# Patient Record
Sex: MALE | Race: WHITE | NOT HISPANIC OR LATINO | Employment: OTHER | URBAN - METROPOLITAN AREA
[De-identification: names, ages, dates, MRNs, and addresses within clinical notes are randomized per-mention and may not be internally consistent; named-entity substitution may affect disease eponyms.]

---

## 2017-02-27 ENCOUNTER — HOSPITAL ENCOUNTER (OUTPATIENT)
Dept: PAIN MEDICINE | Facility: HOSPITAL | Age: 62
Discharge: HOME | End: 2017-02-27
Attending: PAIN MEDICINE | Admitting: PAIN MEDICINE

## 2017-03-02 ENCOUNTER — HOSPITAL ENCOUNTER (OUTPATIENT)
Dept: OTHER | Facility: HOSPITAL | Age: 62
Discharge: HOME | End: 2017-03-02
Attending: FAMILY MEDICINE | Admitting: FAMILY MEDICINE

## 2017-03-02 LAB
ALBUMIN SERPL-MCNC: 4.3 G/DL (ref 3.5–4.8)
ALBUMIN/GLOB SERPL: 1.6 {RATIO} (ref 1–1.7)
ALP SERPL-CCNC: 41 IU/L (ref 32–91)
ALT SERPL-CCNC: 24 IU/L (ref 17–63)
ANION GAP SERPL CALC-SCNC: 17.2 MMOL/L (ref 10–20)
AST SERPL-CCNC: 23 IU/L (ref 15–41)
BASOPHILS # BLD AUTO: 0.1 10*3/UL (ref 0–0.2)
BASOPHILS NFR BLD AUTO: 1 % (ref 0–2)
BILIRUB SERPL-MCNC: 0.9 MG/DL (ref 0.3–1.2)
BILIRUB UR QL STRIP: NEGATIVE MG/DL
BUN SERPL-MCNC: 14 MG/DL (ref 8–20)
BUN/CREAT SERPL: 11.7 (ref 6.2–20.3)
CALCIUM SERPL-MCNC: 9.8 MG/DL (ref 8.9–10.3)
CASTS URNS QL MICRO: NORMAL /[LPF]
CHLORIDE SERPL-SCNC: 101 MMOL/L (ref 101–111)
CHOLEST SERPL-MCNC: 196 MG/DL
CHOLEST/HDLC SERPL: 4.9 {RATIO}
COLOR UR: YELLOW
CONV BACTERIA IN URINE MICRO: NEGATIVE
CONV CLARITY OF URINE: CLEAR
CONV CO2: 26 MMOL/L (ref 22–32)
CONV HYALINE CASTS IN URINE MICRO: 3 /[LPF] (ref 0–5)
CONV LDL CHOLESTEROL DIRECT: 102 MG/DL (ref 0–100)
CONV MICROALBUM.,U,RANDOM: <2 MG/L
CONV PROTEIN IN URINE BY AUTOMATED TEST STRIP: NEGATIVE MG/DL
CONV SMALL ROUND CELLS: NORMAL /[HPF]
CONV TOTAL PROTEIN: 7 G/DL (ref 6.1–7.9)
CONV UROBILINOGEN IN URINE BY AUTOMATED TEST STRIP: 0.2 MG/DL
CREAT 24H UR-MCNC: 83.1 MG/DL
CREAT UR-MCNC: 1.2 MG/DL (ref 0.7–1.2)
CULTURE INDICATED?: NORMAL
DIFFERENTIAL METHOD BLD: (no result)
EOSINOPHIL # BLD AUTO: 0.3 10*3/UL (ref 0–0.3)
EOSINOPHIL # BLD AUTO: 5 % (ref 0–3)
ERYTHROCYTE [DISTWIDTH] IN BLOOD BY AUTOMATED COUNT: 14.8 % (ref 11.5–14.5)
GLOBULIN UR ELPH-MCNC: 2.7 G/DL (ref 2.5–3.8)
GLUCOSE SERPL-MCNC: 109 MG/DL (ref 65–99)
GLUCOSE UR QL: NEGATIVE MG/DL
HCT VFR BLD AUTO: 38.1 % (ref 40–54)
HDLC SERPL-MCNC: 40 MG/DL
HGB BLD-MCNC: 13.6 G/DL (ref 14–18)
HGB UR QL STRIP: NEGATIVE
KETONES UR QL STRIP: NEGATIVE MG/DL
LDLC/HDLC SERPL: 2.5 {RATIO}
LEUKOCYTE ESTERASE UR QL STRIP: NEGATIVE
LIPID INTERPRETATION: ABNORMAL
LYMPHOCYTES # BLD AUTO: 2.7 10*3/UL (ref 0.8–4.8)
LYMPHOCYTES NFR BLD AUTO: 40 % (ref 18–42)
MCH RBC QN AUTO: 32.6 PG (ref 26–32)
MCHC RBC AUTO-ENTMCNC: 35.8 G/DL (ref 32–36)
MCV RBC AUTO: 91.1 FL (ref 80–94)
MICROALBUMIN/CREAT UR: NORMAL UG/MG
MONOCYTES # BLD AUTO: 0.6 10*3/UL (ref 0.1–1.3)
MONOCYTES NFR BLD AUTO: 10 % (ref 2–11)
NEUTROPHILS # BLD AUTO: 3 10*3/UL (ref 2.3–8.6)
NEUTROPHILS NFR BLD AUTO: 44 % (ref 50–75)
NITRITE UR QL STRIP: NEGATIVE
NRBC BLD AUTO-RTO: 0 /100{WBCS}
NRBC/RBC NFR BLD MANUAL: 0 10*3/UL
PH UR STRIP.AUTO: 5 [PH] (ref 4.5–8)
PLATELET # BLD AUTO: 244 10*3/UL (ref 150–450)
PMV BLD AUTO: 8.1 FL (ref 7.4–10.4)
POTASSIUM SERPL-SCNC: 4.2 MMOL/L (ref 3.6–5.1)
RBC # BLD AUTO: 4.18 10*6/UL (ref 4.6–6)
RBC #/AREA URNS HPF: 0 /[HPF] (ref 0–3)
SODIUM SERPL-SCNC: 140 MMOL/L (ref 136–144)
SP GR UR: 1.01 (ref 1–1.03)
SPERM URNS QL MICRO: NORMAL /[HPF]
SQUAMOUS SPT QL MICRO: 1 /[HPF] (ref 0–5)
TRIGL SERPL-MCNC: 406 MG/DL
TSH SERPL-ACNC: 4.01 UIU/ML (ref 0.34–5.6)
UNIDENT CRYS URNS QL MICRO: NORMAL /[HPF]
VLDLC SERPL CALC-MCNC: 53.5 MG/DL
WBC # BLD AUTO: 6.7 10*3/UL (ref 4.5–11.5)
WBC #/AREA URNS HPF: 1 /[HPF] (ref 0–5)
YEAST SPEC QL WET PREP: NORMAL /[HPF]

## 2017-03-13 ENCOUNTER — HOSPITAL ENCOUNTER (OUTPATIENT)
Dept: PAIN MEDICINE | Facility: HOSPITAL | Age: 62
Discharge: HOME | End: 2017-03-13
Attending: PAIN MEDICINE | Admitting: PAIN MEDICINE

## 2017-03-27 ENCOUNTER — HOSPITAL ENCOUNTER (OUTPATIENT)
Dept: PAIN MEDICINE | Facility: HOSPITAL | Age: 62
Discharge: HOME | End: 2017-03-27
Attending: PAIN MEDICINE | Admitting: PAIN MEDICINE

## 2017-06-15 ENCOUNTER — HOSPITAL ENCOUNTER (OUTPATIENT)
Dept: LAB | Facility: HOSPITAL | Age: 62
Discharge: HOME | End: 2017-06-15
Attending: INTERNAL MEDICINE | Admitting: INTERNAL MEDICINE

## 2017-06-15 LAB
ANION GAP SERPL CALC-SCNC: 14.3 MMOL/L (ref 10–20)
BUN SERPL-MCNC: 12 MG/DL (ref 8–20)
BUN/CREAT SERPL: 12 (ref 6.2–20.3)
CALCIUM SERPL-MCNC: 9.7 MG/DL (ref 8.9–10.3)
CHLORIDE SERPL-SCNC: 97 MMOL/L (ref 101–111)
CONV CO2: 29 MMOL/L (ref 22–32)
CREAT UR-MCNC: 1 MG/DL (ref 0.7–1.2)
GLUCOSE SERPL-MCNC: 133 MG/DL (ref 65–99)
POTASSIUM SERPL-SCNC: ABNORMAL MMOL/L (ref 3.6–5.1)
SODIUM SERPL-SCNC: 136 MMOL/L (ref 136–144)

## 2017-06-29 ENCOUNTER — HOSPITAL ENCOUNTER (OUTPATIENT)
Dept: LAB | Facility: HOSPITAL | Age: 62
Discharge: HOME | End: 2017-06-29
Attending: INTERNAL MEDICINE | Admitting: INTERNAL MEDICINE

## 2017-06-29 LAB
ANION GAP SERPL CALC-SCNC: 14.2 MMOL/L (ref 10–20)
BUN SERPL-MCNC: 6 MG/DL (ref 8–20)
BUN/CREAT SERPL: 6 (ref 6.2–20.3)
CALCIUM SERPL-MCNC: 10.1 MG/DL (ref 8.9–10.3)
CHLORIDE SERPL-SCNC: 103 MMOL/L (ref 101–111)
CONV CO2: 26 MMOL/L (ref 22–32)
CREAT UR-MCNC: 1 MG/DL (ref 0.7–1.2)
GLUCOSE SERPL-MCNC: 120 MG/DL (ref 65–99)
POTASSIUM SERPL-SCNC: 4.2 MMOL/L (ref 3.6–5.1)
SODIUM SERPL-SCNC: 139 MMOL/L (ref 136–144)

## 2017-12-29 ENCOUNTER — HOSPITAL ENCOUNTER (OUTPATIENT)
Dept: PHARMACY | Facility: HOSPITAL | Age: 62
Discharge: HOME | End: 2017-12-29
Attending: FAMILY MEDICINE | Admitting: FAMILY MEDICINE

## 2017-12-29 LAB
ALBUMIN SERPL-MCNC: 4.5 G/DL (ref 3.5–4.8)
ALBUMIN/GLOB SERPL: 1.6 {RATIO} (ref 1–1.7)
ALP SERPL-CCNC: 35 IU/L (ref 32–91)
ALT SERPL-CCNC: 22 IU/L (ref 17–63)
ANION GAP SERPL CALC-SCNC: 14.2 MMOL/L (ref 10–20)
AST SERPL-CCNC: 27 IU/L (ref 15–41)
BASOPHILS # BLD AUTO: 0 10*3/UL (ref 0–0.2)
BASOPHILS NFR BLD AUTO: 0 % (ref 0–2)
BILIRUB SERPL-MCNC: 0.9 MG/DL (ref 0.3–1.2)
BUN SERPL-MCNC: 7 MG/DL (ref 8–20)
BUN/CREAT SERPL: 7.8 (ref 6.2–20.3)
CALCIUM SERPL-MCNC: 10 MG/DL (ref 8.9–10.3)
CHLORIDE SERPL-SCNC: 100 MMOL/L (ref 101–111)
CONV CO2: 26 MMOL/L (ref 22–32)
CONV TOTAL PROTEIN: 7.3 G/DL (ref 6.1–7.9)
CREAT UR-MCNC: 0.9 MG/DL (ref 0.7–1.2)
DIFFERENTIAL METHOD BLD: (no result)
EOSINOPHIL # BLD AUTO: 0.1 10*3/UL (ref 0–0.3)
EOSINOPHIL # BLD AUTO: 2 % (ref 0–3)
ERYTHROCYTE [DISTWIDTH] IN BLOOD BY AUTOMATED COUNT: 13.8 % (ref 11.5–14.5)
GLOBULIN UR ELPH-MCNC: 2.8 G/DL (ref 2.5–3.8)
GLUCOSE SERPL-MCNC: 106 MG/DL (ref 65–99)
HCT VFR BLD AUTO: 44.4 % (ref 40–54)
HGB BLD-MCNC: 15 G/DL (ref 14–18)
LYMPHOCYTES # BLD AUTO: 2.2 10*3/UL (ref 0.8–4.8)
LYMPHOCYTES NFR BLD AUTO: 29 % (ref 18–42)
MCH RBC QN AUTO: 32.9 PG (ref 26–32)
MCHC RBC AUTO-ENTMCNC: 33.7 G/DL (ref 32–36)
MCV RBC AUTO: 97.4 FL (ref 80–94)
MONOCYTES # BLD AUTO: 1 10*3/UL (ref 0.1–1.3)
MONOCYTES NFR BLD AUTO: 13 % (ref 2–11)
NEUTROPHILS # BLD AUTO: 4.2 10*3/UL (ref 2.3–8.6)
NEUTROPHILS NFR BLD AUTO: 56 % (ref 50–75)
NRBC BLD AUTO-RTO: 0 /100{WBCS}
NRBC/RBC NFR BLD MANUAL: 0 10*3/UL
PLATELET # BLD AUTO: 248 10*3/UL (ref 150–450)
PMV BLD AUTO: 7.7 FL (ref 7.4–10.4)
POTASSIUM SERPL-SCNC: 4.2 MMOL/L (ref 3.6–5.1)
RBC # BLD AUTO: 4.56 10*6/UL (ref 4.6–6)
SODIUM SERPL-SCNC: 136 MMOL/L (ref 136–144)
WBC # BLD AUTO: 7.6 10*3/UL (ref 4.5–11.5)

## 2018-01-01 ENCOUNTER — ON CAMPUS - OUTPATIENT (OUTPATIENT)
Dept: URBAN - METROPOLITAN AREA HOSPITAL 85 | Facility: HOSPITAL | Age: 63
End: 2018-01-01
Payer: COMMERCIAL

## 2018-01-01 DIAGNOSIS — Z96.89 PRESENCE OF OTHER SPECIFIED FUNCTIONAL IMPLANTS: ICD-10-CM

## 2018-01-01 DIAGNOSIS — K91.89 OTHER POSTPROCEDURAL COMPLICATIONS AND DISORDERS OF DIGESTIV: ICD-10-CM

## 2018-01-01 PROCEDURE — 43275 ERCP REMOVE FORGN BODY DUCT: CPT | Performed by: INTERNAL MEDICINE

## 2018-03-23 ENCOUNTER — HOSPITAL ENCOUNTER (OUTPATIENT)
Dept: CT IMAGING | Facility: HOSPITAL | Age: 63
Discharge: HOME | End: 2018-03-23
Attending: FAMILY MEDICINE | Admitting: FAMILY MEDICINE

## 2018-04-04 ENCOUNTER — HOSPITAL ENCOUNTER (OUTPATIENT)
Dept: NUCLEAR MEDICINE | Facility: HOSPITAL | Age: 63
Discharge: HOME | End: 2018-04-04
Attending: FAMILY MEDICINE | Admitting: FAMILY MEDICINE

## 2018-04-12 ENCOUNTER — HOSPITAL ENCOUNTER (OUTPATIENT)
Dept: NUCLEAR MEDICINE | Facility: HOSPITAL | Age: 63
Discharge: HOME | End: 2018-04-12
Attending: FAMILY MEDICINE | Admitting: FAMILY MEDICINE

## 2018-05-12 ENCOUNTER — HOSPITAL ENCOUNTER (OUTPATIENT)
Dept: PREADMISSION TESTING | Facility: HOSPITAL | Age: 63
Discharge: HOME | End: 2018-05-12
Attending: SURGERY | Admitting: SURGERY

## 2018-05-12 LAB
ANION GAP SERPL CALC-SCNC: 14.1 MMOL/L (ref 10–20)
BASOPHILS # BLD AUTO: 0.1 10*3/UL (ref 0–0.2)
BASOPHILS NFR BLD AUTO: 1 % (ref 0–2)
BUN SERPL-MCNC: 18 MG/DL (ref 8–20)
BUN/CREAT SERPL: 18 (ref 6.2–20.3)
CALCIUM SERPL-MCNC: 9.5 MG/DL (ref 8.9–10.3)
CHLORIDE SERPL-SCNC: 103 MMOL/L (ref 101–111)
CONV CO2: 27 MMOL/L (ref 22–32)
CREAT UR-MCNC: 1 MG/DL (ref 0.7–1.2)
DIFFERENTIAL METHOD BLD: (no result)
EOSINOPHIL # BLD AUTO: 0 10*3/UL (ref 0–0.3)
EOSINOPHIL # BLD AUTO: 1 % (ref 0–3)
ERYTHROCYTE [DISTWIDTH] IN BLOOD BY AUTOMATED COUNT: 13.6 % (ref 11.5–14.5)
GLUCOSE SERPL-MCNC: 105 MG/DL (ref 65–99)
HCT VFR BLD AUTO: 43.9 % (ref 40–54)
HGB BLD-MCNC: 14.9 G/DL (ref 14–18)
LYMPHOCYTES # BLD AUTO: 1.8 10*3/UL (ref 0.8–4.8)
LYMPHOCYTES NFR BLD AUTO: 31 % (ref 18–42)
MCH RBC QN AUTO: 32.1 PG (ref 26–32)
MCHC RBC AUTO-ENTMCNC: 33.8 G/DL (ref 32–36)
MCV RBC AUTO: 94.9 FL (ref 80–94)
MONOCYTES # BLD AUTO: 0.6 10*3/UL (ref 0.1–1.3)
MONOCYTES NFR BLD AUTO: 11 % (ref 2–11)
NEUTROPHILS # BLD AUTO: 3.4 10*3/UL (ref 2.3–8.6)
NEUTROPHILS NFR BLD AUTO: 56 % (ref 50–75)
NRBC BLD AUTO-RTO: 0 /100{WBCS}
NRBC/RBC NFR BLD MANUAL: 0 10*3/UL
PLATELET # BLD AUTO: 253 10*3/UL (ref 150–450)
PMV BLD AUTO: 7.3 FL (ref 7.4–10.4)
POTASSIUM SERPL-SCNC: 4.1 MMOL/L (ref 3.6–5.1)
RBC # BLD AUTO: 4.63 10*6/UL (ref 4.6–6)
SODIUM SERPL-SCNC: 140 MMOL/L (ref 136–144)
WBC # BLD AUTO: 5.9 10*3/UL (ref 4.5–11.5)

## 2018-05-29 ENCOUNTER — HOSPITAL ENCOUNTER (OUTPATIENT)
Dept: LAB | Facility: HOSPITAL | Age: 63
Discharge: HOME | End: 2018-05-29
Attending: FAMILY MEDICINE | Admitting: FAMILY MEDICINE

## 2018-05-29 LAB
BASOPHILS # BLD AUTO: 0 10*3/UL (ref 0–0.2)
BASOPHILS NFR BLD AUTO: 0 % (ref 0–2)
CONV ANISOCYTES: SLIGHT
CONV POIKILOCYTOSIS IN BLOOD BY LIGHT MICROSCOPY: SLIGHT
CONV SMEAR REVIEW: (no result)
CONV TOXIC GRANULES IN BLOOD BY LIGHT MICROSCOPY: SLIGHT
DIFFERENTIAL METHOD BLD: (no result)
EOSINOPHIL # BLD AUTO: 0.2 10*3/UL (ref 0–0.3)
EOSINOPHIL # BLD AUTO: 1 % (ref 0–3)
ERYTHROCYTE [DISTWIDTH] IN BLOOD BY AUTOMATED COUNT: 14.4 % (ref 11.5–14.5)
HCT VFR BLD AUTO: 37 % (ref 40–54)
HGB BLD-MCNC: 12.1 G/DL (ref 14–18)
LYMPHOCYTES # BLD AUTO: 1.9 10*3/UL (ref 0.8–4.8)
LYMPHOCYTES NFR BLD AUTO: 9 % (ref 18–42)
MCH RBC QN AUTO: 31.4 PG (ref 26–32)
MCHC RBC AUTO-ENTMCNC: 32.7 G/DL (ref 32–36)
MCV RBC AUTO: 96.1 FL (ref 80–94)
MONOCYTES # BLD AUTO: 1.5 10*3/UL (ref 0.1–1.3)
MONOCYTES NFR BLD AUTO: 7 % (ref 2–11)
NEUTROPHILS # BLD AUTO: 17.4 10*3/UL (ref 2.3–8.6)
NEUTROPHILS NFR BLD AUTO: 83 % (ref 50–75)
NRBC BLD AUTO-RTO: 0 /100{WBCS}
PLATELET # BLD AUTO: 385 10*3/UL (ref 150–450)
PMV BLD AUTO: 8.1 FL (ref 7.4–10.4)
RBC # BLD AUTO: 3.85 10*6/UL (ref 4.6–6)
WBC # BLD AUTO: 21 10*3/UL (ref 4.5–11.5)

## 2018-05-31 ENCOUNTER — HOSPITAL ENCOUNTER (OUTPATIENT)
Dept: HOME HEALTH SERVICES | Facility: HOME HEALTHCARE | Age: 63
Setting detail: SPECIMEN
Discharge: HOME | End: 2018-05-31
Attending: INTERNAL MEDICINE | Admitting: INTERNAL MEDICINE

## 2018-05-31 LAB
ALBUMIN SERPL-MCNC: 2.7 G/DL (ref 3.5–4.8)
ALBUMIN/GLOB SERPL: 0.8 {RATIO} (ref 1–1.7)
ALP SERPL-CCNC: 75 IU/L (ref 32–91)
ALT SERPL-CCNC: 33 IU/L (ref 17–63)
ANION GAP SERPL CALC-SCNC: 12.6 MMOL/L (ref 10–20)
AST SERPL-CCNC: 22 IU/L (ref 15–41)
BASOPHILS # BLD AUTO: 0 10*3/UL (ref 0–0.2)
BASOPHILS NFR BLD AUTO: 0 % (ref 0–2)
BILIRUB SERPL-MCNC: 0.7 MG/DL (ref 0.3–1.2)
BUN SERPL-MCNC: 11 MG/DL (ref 8–20)
BUN/CREAT SERPL: 11 (ref 6.2–20.3)
CALCIUM SERPL-MCNC: 8.9 MG/DL (ref 8.9–10.3)
CHLORIDE SERPL-SCNC: 101 MMOL/L (ref 101–111)
CONV CO2: 26 MMOL/L (ref 22–32)
CONV TOTAL PROTEIN: 6.3 G/DL (ref 6.1–7.9)
CREAT UR-MCNC: 1 MG/DL (ref 0.7–1.2)
DIFFERENTIAL METHOD BLD: (no result)
EOSINOPHIL # BLD AUTO: 0.1 10*3/UL (ref 0–0.3)
EOSINOPHIL # BLD AUTO: 1 % (ref 0–3)
ERYTHROCYTE [DISTWIDTH] IN BLOOD BY AUTOMATED COUNT: 14 % (ref 11.5–14.5)
GLOBULIN UR ELPH-MCNC: 3.6 G/DL (ref 2.5–3.8)
GLUCOSE SERPL-MCNC: 107 MG/DL (ref 65–99)
HCT VFR BLD AUTO: 34.1 % (ref 40–54)
HGB BLD-MCNC: 11.2 G/DL (ref 14–18)
LYMPHOCYTES # BLD AUTO: 1.8 10*3/UL (ref 0.8–4.8)
LYMPHOCYTES NFR BLD AUTO: 16 % (ref 18–42)
MCH RBC QN AUTO: 31.6 PG (ref 26–32)
MCHC RBC AUTO-ENTMCNC: 32.9 G/DL (ref 32–36)
MCV RBC AUTO: 95.9 FL (ref 80–94)
MONOCYTES # BLD AUTO: 0.9 10*3/UL (ref 0.1–1.3)
MONOCYTES NFR BLD AUTO: 8 % (ref 2–11)
NEUTROPHILS # BLD AUTO: 8.3 10*3/UL (ref 2.3–8.6)
NEUTROPHILS NFR BLD AUTO: 75 % (ref 50–75)
NRBC BLD AUTO-RTO: 0 /100{WBCS}
NRBC/RBC NFR BLD MANUAL: 0 10*3/UL
PLATELET # BLD AUTO: 393 10*3/UL (ref 150–450)
PMV BLD AUTO: 7.8 FL (ref 7.4–10.4)
POTASSIUM SERPL-SCNC: 3.6 MMOL/L (ref 3.6–5.1)
RBC # BLD AUTO: 3.55 10*6/UL (ref 4.6–6)
SODIUM SERPL-SCNC: 136 MMOL/L (ref 136–144)
WBC # BLD AUTO: 11.1 10*3/UL (ref 4.5–11.5)

## 2018-06-01 ENCOUNTER — HOSPITAL ENCOUNTER (OUTPATIENT)
Dept: CT IMAGING | Facility: HOSPITAL | Age: 63
Discharge: HOME | End: 2018-06-01
Attending: SURGERY | Admitting: SURGERY

## 2018-06-07 ENCOUNTER — HOSPITAL ENCOUNTER (OUTPATIENT)
Dept: HOME HEALTH SERVICES | Facility: HOME HEALTHCARE | Age: 63
Setting detail: SPECIMEN
Discharge: HOME | End: 2018-06-07
Attending: INTERNAL MEDICINE | Admitting: INTERNAL MEDICINE

## 2018-06-07 LAB
ALBUMIN SERPL-MCNC: 3.3 G/DL (ref 3.5–4.8)
ALBUMIN/GLOB SERPL: 1.1 {RATIO} (ref 1–1.7)
ALP SERPL-CCNC: 161 IU/L (ref 32–91)
ALT SERPL-CCNC: 32 IU/L (ref 17–63)
ANION GAP SERPL CALC-SCNC: 13.9 MMOL/L (ref 10–20)
AST SERPL-CCNC: 23 IU/L (ref 15–41)
BASOPHILS # BLD AUTO: 0 10*3/UL (ref 0–0.2)
BASOPHILS NFR BLD AUTO: 1 % (ref 0–2)
BILIRUB SERPL-MCNC: 0.6 MG/DL (ref 0.3–1.2)
BUN SERPL-MCNC: 10 MG/DL (ref 8–20)
BUN/CREAT SERPL: 12.5 (ref 6.2–20.3)
CALCIUM SERPL-MCNC: 9.5 MG/DL (ref 8.9–10.3)
CHLORIDE SERPL-SCNC: 97 MMOL/L (ref 101–111)
CONV CO2: 27 MMOL/L (ref 22–32)
CONV TOTAL PROTEIN: 6.2 G/DL (ref 6.1–7.9)
CREAT UR-MCNC: 0.8 MG/DL (ref 0.7–1.2)
DIFFERENTIAL METHOD BLD: (no result)
EOSINOPHIL # BLD AUTO: 0.1 10*3/UL (ref 0–0.3)
EOSINOPHIL # BLD AUTO: 1 % (ref 0–3)
ERYTHROCYTE [DISTWIDTH] IN BLOOD BY AUTOMATED COUNT: 13.6 % (ref 11.5–14.5)
GLOBULIN UR ELPH-MCNC: 2.9 G/DL (ref 2.5–3.8)
GLUCOSE SERPL-MCNC: 141 MG/DL (ref 65–99)
HCT VFR BLD AUTO: 33.9 % (ref 40–54)
HGB BLD-MCNC: 11.4 G/DL (ref 14–18)
LYMPHOCYTES # BLD AUTO: 1.5 10*3/UL (ref 0.8–4.8)
LYMPHOCYTES NFR BLD AUTO: 23 % (ref 18–42)
MCH RBC QN AUTO: 31.5 PG (ref 26–32)
MCHC RBC AUTO-ENTMCNC: 33.6 G/DL (ref 32–36)
MCV RBC AUTO: 94 FL (ref 80–94)
MONOCYTES # BLD AUTO: 1 10*3/UL (ref 0.1–1.3)
MONOCYTES NFR BLD AUTO: 15 % (ref 2–11)
NEUTROPHILS # BLD AUTO: 4 10*3/UL (ref 2.3–8.6)
NEUTROPHILS NFR BLD AUTO: 60 % (ref 50–75)
NRBC BLD AUTO-RTO: 0 /100{WBCS}
NRBC/RBC NFR BLD MANUAL: 0 10*3/UL
PLATELET # BLD AUTO: 251 10*3/UL (ref 150–450)
PMV BLD AUTO: 7.5 FL (ref 7.4–10.4)
POTASSIUM SERPL-SCNC: 3.9 MMOL/L (ref 3.6–5.1)
RBC # BLD AUTO: 3.61 10*6/UL (ref 4.6–6)
SODIUM SERPL-SCNC: 134 MMOL/L (ref 136–144)
WBC # BLD AUTO: 6.6 10*3/UL (ref 4.5–11.5)

## 2018-06-11 ENCOUNTER — ON CAMPUS - OUTPATIENT (OUTPATIENT)
Dept: URBAN - METROPOLITAN AREA HOSPITAL 85 | Facility: HOSPITAL | Age: 63
End: 2018-06-11
Payer: COMMERCIAL

## 2018-06-11 ENCOUNTER — HOSPITAL ENCOUNTER (OUTPATIENT)
Dept: PREOP | Facility: HOSPITAL | Age: 63
Setting detail: HOSPITAL OUTPATIENT SURGERY
Discharge: HOME | End: 2018-06-11
Attending: INTERNAL MEDICINE | Admitting: INTERNAL MEDICINE

## 2018-06-11 DIAGNOSIS — K83.8 OTHER SPECIFIED DISEASES OF BILIARY TRACT: ICD-10-CM

## 2018-06-11 DIAGNOSIS — K57.10 DIVERTICULOSIS OF SMALL INTESTINE WITHOUT PERFORATION OR ABS: ICD-10-CM

## 2018-06-11 LAB — GLUCOSE BLD-MCNC: 81 MG/DL (ref 70–105)

## 2018-06-11 PROCEDURE — 43274 ERCP DUCT STENT PLACEMENT: CPT | Performed by: INTERNAL MEDICINE

## 2018-06-16 ENCOUNTER — HOSPITAL ENCOUNTER (OUTPATIENT)
Dept: CT IMAGING | Facility: HOSPITAL | Age: 63
Discharge: HOME | End: 2018-06-16
Attending: SURGERY | Admitting: SURGERY

## 2018-08-09 ENCOUNTER — HOSPITAL ENCOUNTER (OUTPATIENT)
Dept: LAB | Facility: HOSPITAL | Age: 63
Discharge: HOME | End: 2018-08-09
Attending: FAMILY MEDICINE | Admitting: FAMILY MEDICINE

## 2018-08-27 ENCOUNTER — HOSPITAL ENCOUNTER (OUTPATIENT)
Dept: CARDIAC REHAB | Facility: HOSPITAL | Age: 63
Setting detail: RECURRING SERIES
Discharge: HOME | End: 2018-11-12
Attending: INTERNAL MEDICINE | Admitting: INTERNAL MEDICINE

## 2018-11-02 ENCOUNTER — HOSPITAL ENCOUNTER (OUTPATIENT)
Dept: PREADMISSION TESTING | Facility: HOSPITAL | Age: 63
Discharge: HOME | End: 2018-11-02
Attending: INTERNAL MEDICINE | Admitting: INTERNAL MEDICINE

## 2018-11-02 LAB
ANION GAP SERPL CALC-SCNC: 14.9 MMOL/L (ref 10–20)
BASOPHILS # BLD AUTO: 0 10*3/UL (ref 0–0.2)
BASOPHILS NFR BLD AUTO: 0 % (ref 0–2)
BUN SERPL-MCNC: 17 MG/DL (ref 8–20)
BUN/CREAT SERPL: 12.1 (ref 6.2–20.3)
CALCIUM SERPL-MCNC: 10.2 MG/DL (ref 8.9–10.3)
CHLORIDE SERPL-SCNC: 103 MMOL/L (ref 101–111)
CONV CO2: 24 MMOL/L (ref 22–32)
CREAT UR-MCNC: 1.4 MG/DL (ref 0.7–1.2)
DIFFERENTIAL METHOD BLD: (no result)
EOSINOPHIL # BLD AUTO: 0 % (ref 0–3)
EOSINOPHIL # BLD AUTO: 0 10*3/UL (ref 0–0.3)
ERYTHROCYTE [DISTWIDTH] IN BLOOD BY AUTOMATED COUNT: 13.6 % (ref 11.5–14.5)
GLUCOSE SERPL-MCNC: 153 MG/DL (ref 65–99)
HCT VFR BLD AUTO: 35.9 % (ref 40–54)
HGB BLD-MCNC: 12.3 G/DL (ref 14–18)
LYMPHOCYTES # BLD AUTO: 1.8 10*3/UL (ref 0.8–4.8)
LYMPHOCYTES NFR BLD AUTO: 13 % (ref 18–42)
MCH RBC QN AUTO: 32.6 PG (ref 26–32)
MCHC RBC AUTO-ENTMCNC: 34.3 G/DL (ref 32–36)
MCV RBC AUTO: 95 FL (ref 80–94)
MONOCYTES # BLD AUTO: 1.2 10*3/UL (ref 0.1–1.3)
MONOCYTES NFR BLD AUTO: 9 % (ref 2–11)
NEUTROPHILS # BLD AUTO: 10.6 10*3/UL (ref 2.3–8.6)
NEUTROPHILS NFR BLD AUTO: 78 % (ref 50–75)
NRBC BLD AUTO-RTO: 0 /100{WBCS}
NRBC/RBC NFR BLD MANUAL: 0 10*3/UL
PLATELET # BLD AUTO: 401 10*3/UL (ref 150–450)
PMV BLD AUTO: 7.4 FL (ref 7.4–10.4)
POTASSIUM SERPL-SCNC: 4.9 MMOL/L (ref 3.6–5.1)
RBC # BLD AUTO: 3.78 10*6/UL (ref 4.6–6)
SODIUM SERPL-SCNC: 137 MMOL/L (ref 136–144)
WBC # BLD AUTO: 13.6 10*3/UL (ref 4.5–11.5)

## 2018-11-03 LAB
C DIFF TOX GENS STL QL NAA+PROBE: NEGATIVE
CDIFF INTERPRETATION: ABNORMAL
CONV C.DIFFICILE DNA: NORMAL
CONV CDIFF GDH AG: POSITIVE

## 2018-11-06 ENCOUNTER — HOSPITAL ENCOUNTER (OUTPATIENT)
Dept: PREOP | Facility: HOSPITAL | Age: 63
Setting detail: HOSPITAL OUTPATIENT SURGERY
Discharge: HOME | End: 2018-11-06
Attending: INTERNAL MEDICINE | Admitting: INTERNAL MEDICINE

## 2018-11-06 LAB — GLUCOSE BLD-MCNC: 99 MG/DL (ref 70–105)

## 2019-04-30 ENCOUNTER — HOSPITAL ENCOUNTER (OUTPATIENT)
Dept: LAB | Facility: HOSPITAL | Age: 64
Discharge: HOME | End: 2019-04-30
Attending: FAMILY MEDICINE | Admitting: FAMILY MEDICINE

## 2019-04-30 LAB
ALBUMIN SERPL-MCNC: 4.5 G/DL (ref 3.5–4.8)
ALBUMIN/GLOB SERPL: 1.5 {RATIO} (ref 1–1.7)
ALP SERPL-CCNC: 40 IU/L (ref 32–91)
ALT SERPL-CCNC: 24 IU/L (ref 17–63)
ANION GAP SERPL CALC-SCNC: 17.2 MMOL/L (ref 10–20)
AST SERPL-CCNC: 27 IU/L (ref 15–41)
BASOPHILS # BLD AUTO: 0 10*3/UL (ref 0–0.2)
BASOPHILS NFR BLD AUTO: 0 % (ref 0–2)
BILIRUB SERPL-MCNC: 1.2 MG/DL (ref 0.3–1.2)
BILIRUB UR QL STRIP: NEGATIVE MG/DL
BUN SERPL-MCNC: 19 MG/DL (ref 8–20)
BUN/CREAT SERPL: 13.6 (ref 6.2–20.3)
CALCIUM SERPL-MCNC: 9.7 MG/DL (ref 8.9–10.3)
CASTS URNS QL MICRO: ABNORMAL /[LPF]
CHLORIDE SERPL-SCNC: 97 MMOL/L (ref 101–111)
CHOLEST SERPL-MCNC: 191 MG/DL
CHOLEST/HDLC SERPL: 2.9 {RATIO}
COLOR UR: ABNORMAL
CONV BACTERIA IN URINE MICRO: NEGATIVE
CONV CLARITY OF URINE: CLEAR
CONV CO2: 25 MMOL/L (ref 22–32)
CONV HYALINE CASTS IN URINE MICRO: 2 /[LPF] (ref 0–5)
CONV LDL CHOLESTEROL DIRECT: 103 MG/DL (ref 0–100)
CONV MICROALBUM.,U,RANDOM: 22 MG/L
CONV PROTEIN IN URINE BY AUTOMATED TEST STRIP: 30 MG/DL
CONV SMALL ROUND CELLS: ABNORMAL /[HPF]
CONV TOTAL PROTEIN: 7.5 G/DL (ref 6.1–7.9)
CONV UROBILINOGEN IN URINE BY AUTOMATED TEST STRIP: 1 MG/DL
CREAT 24H UR-MCNC: 265.7 MG/DL
CREAT UR-MCNC: 1.4 MG/DL (ref 0.7–1.2)
CULTURE INDICATED?: ABNORMAL
DIFFERENTIAL METHOD BLD: (no result)
EOSINOPHIL # BLD AUTO: 0 % (ref 0–3)
EOSINOPHIL # BLD AUTO: 0 10*3/UL (ref 0–0.3)
ERYTHROCYTE [DISTWIDTH] IN BLOOD BY AUTOMATED COUNT: 13.3 % (ref 11.5–14.5)
GLOBULIN UR ELPH-MCNC: 3 G/DL (ref 2.5–3.8)
GLUCOSE SERPL-MCNC: 133 MG/DL (ref 65–99)
GLUCOSE UR QL: NEGATIVE MG/DL
HCT VFR BLD AUTO: 44.6 % (ref 40–54)
HDLC SERPL-MCNC: 66 MG/DL
HGB BLD-MCNC: 15.2 G/DL (ref 14–18)
HGB UR QL STRIP: NEGATIVE
KETONES UR QL STRIP: ABNORMAL MG/DL
LDLC/HDLC SERPL: 1.6 {RATIO}
LEUKOCYTE ESTERASE UR QL STRIP: ABNORMAL
LIPID INTERPRETATION: ABNORMAL
LYMPHOCYTES # BLD AUTO: 1 10*3/UL (ref 0.8–4.8)
LYMPHOCYTES NFR BLD AUTO: 13 % (ref 18–42)
MCH RBC QN AUTO: 33.5 PG (ref 26–32)
MCHC RBC AUTO-ENTMCNC: 34.2 G/DL (ref 32–36)
MCV RBC AUTO: 97.8 FL (ref 80–94)
MICROALBUMIN/CREAT UR: 8.3 UG/MG
MONOCYTES # BLD AUTO: 0.3 10*3/UL (ref 0.1–1.3)
MONOCYTES NFR BLD AUTO: 4 % (ref 2–11)
NEUTROPHILS # BLD AUTO: 6.5 10*3/UL (ref 2.3–8.6)
NEUTROPHILS NFR BLD AUTO: 83 % (ref 50–75)
NITRITE UR QL STRIP: POSITIVE
NRBC BLD AUTO-RTO: 0 /100{WBCS}
NRBC/RBC NFR BLD MANUAL: 0 10*3/UL
PH UR STRIP.AUTO: 7 [PH] (ref 4.5–8)
PLATELET # BLD AUTO: 255 10*3/UL (ref 150–450)
PMV BLD AUTO: 7.7 FL (ref 7.4–10.4)
POTASSIUM SERPL-SCNC: 4.2 MMOL/L (ref 3.6–5.1)
RBC # BLD AUTO: 4.56 10*6/UL (ref 4.6–6)
RBC #/AREA URNS HPF: 1 /[HPF] (ref 0–3)
SODIUM SERPL-SCNC: 135 MMOL/L (ref 136–144)
SP GR UR: 1.03 (ref 1–1.03)
SPERM URNS QL MICRO: ABNORMAL /[HPF]
SQUAMOUS SPT QL MICRO: 1 /[HPF] (ref 0–5)
TRIGL SERPL-MCNC: 273 MG/DL
UNIDENT CRYS URNS QL MICRO: ABNORMAL /[HPF]
VLDLC SERPL CALC-MCNC: 22.7 MG/DL
WBC # BLD AUTO: 7.9 10*3/UL (ref 4.5–11.5)
WBC #/AREA URNS HPF: 0 /[HPF] (ref 0–5)
YEAST SPEC QL WET PREP: ABNORMAL /[HPF]

## 2019-05-24 ENCOUNTER — HOSPITAL ENCOUNTER (OUTPATIENT)
Dept: LAB | Facility: HOSPITAL | Age: 64
Discharge: HOME | End: 2019-05-24
Attending: INTERNAL MEDICINE | Admitting: INTERNAL MEDICINE

## 2019-05-24 LAB
ANION GAP SERPL CALC-SCNC: 15.3 MMOL/L (ref 10–20)
BUN SERPL-MCNC: 7 MG/DL (ref 8–20)
BUN/CREAT SERPL: 7 (ref 6.2–20.3)
CALCIUM SERPL-MCNC: 9.3 MG/DL (ref 8.9–10.3)
CHLORIDE SERPL-SCNC: 103 MMOL/L (ref 101–111)
CONV CO2: 25 MMOL/L (ref 22–32)
CREAT UR-MCNC: 1 MG/DL (ref 0.7–1.2)
GLUCOSE SERPL-MCNC: 101 MG/DL (ref 65–99)
POTASSIUM SERPL-SCNC: 4.3 MMOL/L (ref 3.6–5.1)
SODIUM SERPL-SCNC: 139 MMOL/L (ref 136–144)

## 2019-06-12 ENCOUNTER — TRANSCRIBE ORDERS (OUTPATIENT)
Dept: CARDIOLOGY | Facility: CLINIC | Age: 64
End: 2019-06-12

## 2019-06-12 DIAGNOSIS — R07.9 CHEST PAIN, UNSPECIFIED TYPE: Primary | ICD-10-CM

## 2019-06-12 DIAGNOSIS — I25.10 CVD (CARDIOVASCULAR DISEASE): ICD-10-CM

## 2019-08-20 ENCOUNTER — LAB (OUTPATIENT)
Dept: LAB | Facility: HOSPITAL | Age: 64
End: 2019-08-20

## 2019-08-20 ENCOUNTER — TRANSCRIBE ORDERS (OUTPATIENT)
Dept: LAB | Facility: HOSPITAL | Age: 64
End: 2019-08-20

## 2019-08-20 DIAGNOSIS — I10 HYPERTENSION, UNSPECIFIED TYPE: ICD-10-CM

## 2019-08-20 DIAGNOSIS — E83.42 HYPOMAGNESEMIA: ICD-10-CM

## 2019-08-20 DIAGNOSIS — N17.9 ACUTE RENAL FAILURE, UNSPECIFIED ACUTE RENAL FAILURE TYPE (HCC): Primary | ICD-10-CM

## 2019-08-20 DIAGNOSIS — N17.9 ACUTE RENAL FAILURE, UNSPECIFIED ACUTE RENAL FAILURE TYPE (HCC): ICD-10-CM

## 2019-08-20 DIAGNOSIS — E55.9 VITAMIN D DEFICIENCY: ICD-10-CM

## 2019-08-20 LAB
ANION GAP SERPL CALCULATED.3IONS-SCNC: 19.4 MMOL/L (ref 5–15)
BUN BLD-MCNC: 9 MG/DL (ref 8–20)
BUN/CREAT SERPL: 9 (ref 6.2–20.3)
CALCIUM SPEC-SCNC: 9.8 MG/DL (ref 8.9–10.3)
CHLORIDE SERPL-SCNC: 105 MMOL/L (ref 101–111)
CO2 SERPL-SCNC: 25 MMOL/L (ref 22–32)
CREAT BLD-MCNC: 1 MG/DL (ref 0.7–1.2)
GFR SERPL CREATININE-BSD FRML MDRD: 76 ML/MIN/1.73
GLUCOSE BLD-MCNC: 113 MG/DL (ref 65–99)
MAGNESIUM SERPL-MCNC: 1.6 MG/DL (ref 1.8–2.5)
POTASSIUM BLD-SCNC: 4.4 MMOL/L (ref 3.6–5.1)
SODIUM BLD-SCNC: 145 MMOL/L (ref 136–144)

## 2019-08-20 PROCEDURE — 83735 ASSAY OF MAGNESIUM: CPT | Performed by: INTERNAL MEDICINE

## 2019-08-20 PROCEDURE — 80048 BASIC METABOLIC PNL TOTAL CA: CPT | Performed by: INTERNAL MEDICINE

## 2019-08-20 PROCEDURE — 36415 COLL VENOUS BLD VENIPUNCTURE: CPT

## 2019-08-27 ENCOUNTER — TELEPHONE (OUTPATIENT)
Dept: CARDIOLOGY | Facility: CLINIC | Age: 64
End: 2019-08-27

## 2019-08-27 ENCOUNTER — HOSPITAL ENCOUNTER (INPATIENT)
Facility: HOSPITAL | Age: 64
LOS: 3 days | Discharge: HOME | End: 2019-08-30
Attending: EMERGENCY MEDICINE | Admitting: FAMILY MEDICINE

## 2019-08-27 ENCOUNTER — APPOINTMENT (OUTPATIENT)
Dept: GENERAL RADIOLOGY | Facility: HOSPITAL | Age: 64
End: 2019-08-27

## 2019-08-27 DIAGNOSIS — I25.119 CHEST PAIN DUE TO CAD (HCC): Primary | ICD-10-CM

## 2019-08-27 DIAGNOSIS — I25.700 CORONARY ARTERY DISEASE INVOLVING CORONARY BYPASS GRAFT OF NATIVE HEART WITH UNSTABLE ANGINA PECTORIS (HCC): ICD-10-CM

## 2019-08-27 DIAGNOSIS — I21.4 NON-STEMI (NON-ST ELEVATED MYOCARDIAL INFARCTION) (HCC): ICD-10-CM

## 2019-08-27 LAB
ANION GAP SERPL CALCULATED.3IONS-SCNC: 22.4 MMOL/L (ref 5–15)
APTT PPP: 24.7 SECONDS (ref 24–31)
BASOPHILS # BLD AUTO: 0 10*3/MM3 (ref 0–0.2)
BASOPHILS NFR BLD AUTO: 0.1 % (ref 0–1.5)
BUN BLD-MCNC: 5 MG/DL (ref 8–20)
BUN/CREAT SERPL: 5.6 (ref 6.2–20.3)
CALCIUM SPEC-SCNC: 9.9 MG/DL (ref 8.9–10.3)
CHLORIDE SERPL-SCNC: 102 MMOL/L (ref 101–111)
CO2 SERPL-SCNC: 19 MMOL/L (ref 22–32)
CREAT BLD-MCNC: 0.9 MG/DL (ref 0.7–1.2)
DEPRECATED RDW RBC AUTO: 47.7 FL (ref 37–54)
EOSINOPHIL # BLD AUTO: 0 10*3/MM3 (ref 0–0.4)
EOSINOPHIL NFR BLD AUTO: 0.1 % (ref 0.3–6.2)
ERYTHROCYTE [DISTWIDTH] IN BLOOD BY AUTOMATED COUNT: 13.6 % (ref 12.3–15.4)
GFR SERPL CREATININE-BSD FRML MDRD: 86 ML/MIN/1.73
GLUCOSE BLD-MCNC: 254 MG/DL (ref 65–99)
HCT VFR BLD AUTO: 49.4 % (ref 37.5–51)
HGB BLD-MCNC: 17 G/DL (ref 13–17.7)
INR PPP: 0.96 (ref 0.9–1.1)
LYMPHOCYTES # BLD AUTO: 0.9 10*3/MM3 (ref 0.7–3.1)
LYMPHOCYTES NFR BLD AUTO: 17.5 % (ref 19.6–45.3)
MCH RBC QN AUTO: 34.1 PG (ref 26.6–33)
MCHC RBC AUTO-ENTMCNC: 34.3 G/DL (ref 31.5–35.7)
MCV RBC AUTO: 99.4 FL (ref 79–97)
MONOCYTES # BLD AUTO: 0.1 10*3/MM3 (ref 0.1–0.9)
MONOCYTES NFR BLD AUTO: 2.1 % (ref 5–12)
NEUTROPHILS # BLD AUTO: 4.2 10*3/MM3 (ref 1.7–7)
NEUTROPHILS NFR BLD AUTO: 80.2 % (ref 42.7–76)
NRBC BLD AUTO-RTO: 0.1 /100 WBC (ref 0–0.2)
PLATELET # BLD AUTO: 249 10*3/MM3 (ref 140–450)
PMV BLD AUTO: 7.8 FL (ref 6–12)
POTASSIUM BLD-SCNC: 4.4 MMOL/L (ref 3.6–5.1)
PROTHROMBIN TIME: 9.9 SECONDS (ref 9.6–11.7)
RBC # BLD AUTO: 4.98 10*6/MM3 (ref 4.14–5.8)
SODIUM BLD-SCNC: 139 MMOL/L (ref 136–144)
TROPONIN I SERPL-MCNC: 0.27 NG/ML (ref 0–0.03)
WBC NRBC COR # BLD: 5.3 10*3/MM3 (ref 3.4–10.8)

## 2019-08-27 PROCEDURE — 25010000002 HYDROMORPHONE PER 4 MG: Performed by: EMERGENCY MEDICINE

## 2019-08-27 PROCEDURE — 85610 PROTHROMBIN TIME: CPT | Performed by: EMERGENCY MEDICINE

## 2019-08-27 PROCEDURE — 85730 THROMBOPLASTIN TIME PARTIAL: CPT | Performed by: EMERGENCY MEDICINE

## 2019-08-27 PROCEDURE — 80048 BASIC METABOLIC PNL TOTAL CA: CPT | Performed by: EMERGENCY MEDICINE

## 2019-08-27 PROCEDURE — 71045 X-RAY EXAM CHEST 1 VIEW: CPT

## 2019-08-27 PROCEDURE — 99284 EMERGENCY DEPT VISIT MOD MDM: CPT

## 2019-08-27 PROCEDURE — 85025 COMPLETE CBC W/AUTO DIFF WBC: CPT | Performed by: EMERGENCY MEDICINE

## 2019-08-27 PROCEDURE — 84484 ASSAY OF TROPONIN QUANT: CPT | Performed by: EMERGENCY MEDICINE

## 2019-08-27 PROCEDURE — 25010000002 HEPARIN (PORCINE) PER 1000 UNITS: Performed by: EMERGENCY MEDICINE

## 2019-08-27 PROCEDURE — 93005 ELECTROCARDIOGRAM TRACING: CPT | Performed by: EMERGENCY MEDICINE

## 2019-08-27 RX ORDER — MULTIPLE VITAMINS W/ MINERALS TAB 9MG-400MCG
1 TAB ORAL DAILY
COMMUNITY

## 2019-08-27 RX ORDER — ASPIRIN 325 MG
325 TABLET ORAL ONCE
Status: COMPLETED | OUTPATIENT
Start: 2019-08-27 | End: 2019-08-27

## 2019-08-27 RX ORDER — HEPARIN SODIUM 10000 [USP'U]/100ML
1000 INJECTION, SOLUTION INTRAVENOUS
Status: DISCONTINUED | OUTPATIENT
Start: 2019-08-27 | End: 2019-08-30 | Stop reason: HOSPADM

## 2019-08-27 RX ORDER — VENLAFAXINE HYDROCHLORIDE 37.5 MG/1
37.5 CAPSULE, EXTENDED RELEASE ORAL DAILY
COMMUNITY

## 2019-08-27 RX ORDER — FUROSEMIDE 20 MG/1
20 TABLET ORAL DAILY
COMMUNITY

## 2019-08-27 RX ORDER — SODIUM CHLORIDE 9 MG/ML
125 INJECTION, SOLUTION INTRAVENOUS CONTINUOUS
Status: DISCONTINUED | OUTPATIENT
Start: 2019-08-27 | End: 2019-08-28

## 2019-08-27 RX ORDER — LANOLIN ALCOHOL/MO/W.PET/CERES
100 CREAM (GRAM) TOPICAL DAILY
COMMUNITY

## 2019-08-27 RX ORDER — NITROGLYCERIN 20 MG/100ML
5-200 INJECTION INTRAVENOUS
Status: DISCONTINUED | OUTPATIENT
Start: 2019-08-27 | End: 2019-08-30 | Stop reason: HOSPADM

## 2019-08-27 RX ORDER — RANOLAZINE 500 MG/1
1000 TABLET, EXTENDED RELEASE ORAL 2 TIMES DAILY
COMMUNITY

## 2019-08-27 RX ORDER — MELATONIN
2000 DAILY
COMMUNITY

## 2019-08-27 RX ORDER — MONTELUKAST SODIUM 4 MG/1
1 TABLET, CHEWABLE ORAL AS NEEDED
COMMUNITY

## 2019-08-27 RX ORDER — ISOSORBIDE MONONITRATE 30 MG/1
30 TABLET, EXTENDED RELEASE ORAL DAILY
COMMUNITY

## 2019-08-27 RX ORDER — CLONIDINE HYDROCHLORIDE 0.1 MG/1
0.1 TABLET ORAL 2 TIMES DAILY
COMMUNITY

## 2019-08-27 RX ORDER — OMEPRAZOLE 20 MG/1
20 CAPSULE, DELAYED RELEASE ORAL DAILY
COMMUNITY

## 2019-08-27 RX ORDER — ROSUVASTATIN CALCIUM 20 MG/1
20 TABLET, COATED ORAL DAILY
COMMUNITY

## 2019-08-27 RX ORDER — HYDROMORPHONE HCL 110MG/55ML
1 PATIENT CONTROLLED ANALGESIA SYRINGE INTRAVENOUS ONCE
Status: COMPLETED | OUTPATIENT
Start: 2019-08-27 | End: 2019-08-27

## 2019-08-27 RX ORDER — DIPHENOXYLATE HYDROCHLORIDE AND ATROPINE SULFATE 2.5; .025 MG/1; MG/1
1 TABLET ORAL AS NEEDED
COMMUNITY

## 2019-08-27 RX ORDER — PRASUGREL 10 MG/1
10 TABLET, FILM COATED ORAL DAILY
COMMUNITY

## 2019-08-27 RX ORDER — ASPIRIN 81 MG/1
81 TABLET, CHEWABLE ORAL DAILY
COMMUNITY

## 2019-08-27 RX ORDER — SODIUM CHLORIDE 0.9 % (FLUSH) 0.9 %
10 SYRINGE (ML) INJECTION AS NEEDED
Status: DISCONTINUED | OUTPATIENT
Start: 2019-08-27 | End: 2019-08-30 | Stop reason: HOSPADM

## 2019-08-27 RX ORDER — NITROGLYCERIN 0.4 MG/1
0.4 TABLET SUBLINGUAL
COMMUNITY

## 2019-08-27 RX ORDER — POTASSIUM CHLORIDE 20 MEQ/1
20 TABLET, EXTENDED RELEASE ORAL DAILY
COMMUNITY

## 2019-08-27 RX ADMIN — HYDROMORPHONE HYDROCHLORIDE 1 MG: 2 INJECTION, SOLUTION INTRAMUSCULAR; INTRAVENOUS; SUBCUTANEOUS at 20:52

## 2019-08-27 RX ADMIN — HEPARIN SODIUM AND DEXTROSE 1000 UNITS/HR: 10000; 5 INJECTION INTRAVENOUS at 20:51

## 2019-08-27 RX ADMIN — NITROGLYCERIN 5 MCG/MIN: 20 INJECTION INTRAVENOUS at 18:49

## 2019-08-27 RX ADMIN — ASPIRIN 325 MG ORAL TABLET 325 MG: 325 PILL ORAL at 18:48

## 2019-08-27 NOTE — TELEPHONE ENCOUNTER
Pt called c/o chest pain did not want to go to ER but wanted Dr. Palmer to order a troponin. I called Dr. Palmer since he is in [place]. Per Dr. Palmer he wants pt to go to ER. I called pt and let him know, he understood.

## 2019-08-28 PROBLEM — Z82.3 FAMILY HISTORY OF CEREBROVASCULAR ACCIDENT (CVA): Status: ACTIVE | Noted: 2019-08-28

## 2019-08-28 PROBLEM — K21.9 GASTROESOPHAGEAL REFLUX DISEASE: Status: ACTIVE | Noted: 2019-08-28

## 2019-08-28 PROBLEM — Z83.3 FAMILY HISTORY OF DIABETES MELLITUS: Status: ACTIVE | Noted: 2019-08-28

## 2019-08-28 PROBLEM — E11.9 DIABETES MELLITUS (HCC): Status: ACTIVE | Noted: 2017-06-22

## 2019-08-28 PROBLEM — I25.700 CORONARY ARTERY DISEASE INVOLVING CORONARY BYPASS GRAFT OF NATIVE HEART WITH UNSTABLE ANGINA PECTORIS (HCC): Status: ACTIVE | Noted: 2019-08-27

## 2019-08-28 PROBLEM — I25.10 CORONARY ARTERY DISEASE: Status: ACTIVE | Noted: 2019-08-28

## 2019-08-28 PROBLEM — E66.9 OBESITY WITH BODY MASS INDEX 30 OR GREATER: Status: ACTIVE | Noted: 2017-05-31

## 2019-08-28 PROBLEM — E78.5 HYPERLIPIDEMIA: Status: ACTIVE | Noted: 2019-08-28

## 2019-08-28 PROBLEM — I21.4 NON-STEMI (NON-ST ELEVATED MYOCARDIAL INFARCTION) (HCC): Status: ACTIVE | Noted: 2019-08-27

## 2019-08-28 PROBLEM — I10 HYPERTENSION: Status: ACTIVE | Noted: 2019-08-28

## 2019-08-28 LAB
ACT BLD: 131 SECONDS (ref 89–137)
ACT BLD: 213 SECONDS (ref 89–137)
ANION GAP SERPL CALCULATED.3IONS-SCNC: 19.4 MMOL/L (ref 5–15)
APTT PPP: 28.6 SECONDS (ref 61–76.5)
APTT PPP: 38.2 SECONDS (ref 61–76.5)
BASOPHILS # BLD AUTO: 0 10*3/MM3 (ref 0–0.2)
BASOPHILS NFR BLD AUTO: 0.3 % (ref 0–1.5)
BUN BLD-MCNC: 10 MG/DL (ref 8–20)
BUN/CREAT SERPL: 11.1 (ref 6.2–20.3)
CALCIUM SPEC-SCNC: 9 MG/DL (ref 8.9–10.3)
CHLORIDE SERPL-SCNC: 99 MMOL/L (ref 101–111)
CO2 SERPL-SCNC: 20 MMOL/L (ref 22–32)
CREAT BLD-MCNC: 0.9 MG/DL (ref 0.7–1.2)
DEPRECATED RDW RBC AUTO: 45.1 FL (ref 37–54)
EOSINOPHIL # BLD AUTO: 0 10*3/MM3 (ref 0–0.4)
EOSINOPHIL NFR BLD AUTO: 0 % (ref 0.3–6.2)
ERYTHROCYTE [DISTWIDTH] IN BLOOD BY AUTOMATED COUNT: 13.1 % (ref 12.3–15.4)
GFR SERPL CREATININE-BSD FRML MDRD: 86 ML/MIN/1.73
GLUCOSE BLD-MCNC: 147 MG/DL (ref 65–99)
HCT VFR BLD AUTO: 43 % (ref 37.5–51)
HGB BLD-MCNC: 15 G/DL (ref 13–17.7)
INR PPP: 0.98 (ref 0.9–1.1)
LYMPHOCYTES # BLD AUTO: 1.5 10*3/MM3 (ref 0.7–3.1)
LYMPHOCYTES NFR BLD AUTO: 10.3 % (ref 19.6–45.3)
MCH RBC QN AUTO: 34.9 PG (ref 26.6–33)
MCHC RBC AUTO-ENTMCNC: 34.9 G/DL (ref 31.5–35.7)
MCV RBC AUTO: 100 FL (ref 79–97)
MONOCYTES # BLD AUTO: 1.1 10*3/MM3 (ref 0.1–0.9)
MONOCYTES NFR BLD AUTO: 7.8 % (ref 5–12)
NEUTROPHILS # BLD AUTO: 11.5 10*3/MM3 (ref 1.7–7)
NEUTROPHILS NFR BLD AUTO: 81.6 % (ref 42.7–76)
NRBC BLD AUTO-RTO: 0.1 /100 WBC (ref 0–0.2)
PLATELET # BLD AUTO: 207 10*3/MM3 (ref 140–450)
PMV BLD AUTO: 7.2 FL (ref 6–12)
POTASSIUM BLD-SCNC: 4.4 MMOL/L (ref 3.6–5.1)
PROTHROMBIN TIME: 10.1 SECONDS (ref 9.6–11.7)
RBC # BLD AUTO: 4.3 10*6/MM3 (ref 4.14–5.8)
SODIUM BLD-SCNC: 134 MMOL/L (ref 136–144)
TROPONIN I SERPL-MCNC: 0.73 NG/ML (ref 0–0.03)
TROPONIN I SERPL-MCNC: 0.75 NG/ML (ref 0–0.03)
WBC NRBC COR # BLD: 14.2 10*3/MM3 (ref 3.4–10.8)

## 2019-08-28 PROCEDURE — 25010000002 MIDAZOLAM PER 1 MG: Performed by: INTERNAL MEDICINE

## 2019-08-28 PROCEDURE — B2111ZZ FLUOROSCOPY OF MULTIPLE CORONARY ARTERIES USING LOW OSMOLAR CONTRAST: ICD-10-PCS | Performed by: INTERNAL MEDICINE

## 2019-08-28 PROCEDURE — 027034Z DILATION OF CORONARY ARTERY, ONE ARTERY WITH DRUG-ELUTING INTRALUMINAL DEVICE, PERCUTANEOUS APPROACH: ICD-10-PCS | Performed by: INTERNAL MEDICINE

## 2019-08-28 PROCEDURE — C1894 INTRO/SHEATH, NON-LASER: HCPCS | Performed by: INTERNAL MEDICINE

## 2019-08-28 PROCEDURE — C9604 PERC D-E COR REVASC T CABG S: HCPCS | Performed by: INTERNAL MEDICINE

## 2019-08-28 PROCEDURE — C1769 GUIDE WIRE: HCPCS | Performed by: INTERNAL MEDICINE

## 2019-08-28 PROCEDURE — 25010000002 MORPHINE PER 10 MG: Performed by: FAMILY MEDICINE

## 2019-08-28 PROCEDURE — 0 IOPAMIDOL PER 1 ML: Performed by: INTERNAL MEDICINE

## 2019-08-28 PROCEDURE — 85347 COAGULATION TIME ACTIVATED: CPT

## 2019-08-28 PROCEDURE — 4A023N8 MEASUREMENT OF CARDIAC SAMPLING AND PRESSURE, BILATERAL, PERCUTANEOUS APPROACH: ICD-10-PCS | Performed by: INTERNAL MEDICINE

## 2019-08-28 PROCEDURE — B2181ZZ FLUOROSCOPY OF LEFT INTERNAL MAMMARY BYPASS GRAFT USING LOW OSMOLAR CONTRAST: ICD-10-PCS | Performed by: INTERNAL MEDICINE

## 2019-08-28 PROCEDURE — C1725 CATH, TRANSLUMIN NON-LASER: HCPCS | Performed by: INTERNAL MEDICINE

## 2019-08-28 PROCEDURE — 93459 L HRT ART/GRFT ANGIO: CPT | Performed by: INTERNAL MEDICINE

## 2019-08-28 PROCEDURE — 85610 PROTHROMBIN TIME: CPT | Performed by: INTERNAL MEDICINE

## 2019-08-28 PROCEDURE — 85730 THROMBOPLASTIN TIME PARTIAL: CPT | Performed by: FAMILY MEDICINE

## 2019-08-28 PROCEDURE — 80048 BASIC METABOLIC PNL TOTAL CA: CPT | Performed by: INTERNAL MEDICINE

## 2019-08-28 PROCEDURE — C1874 STENT, COATED/COV W/DEL SYS: HCPCS | Performed by: INTERNAL MEDICINE

## 2019-08-28 PROCEDURE — 25010000002 HEPARIN (PORCINE) PER 1000 UNITS: Performed by: INTERNAL MEDICINE

## 2019-08-28 PROCEDURE — 92937 PRQ TRLUML REVSC CAB GRF 1: CPT | Performed by: INTERNAL MEDICINE

## 2019-08-28 PROCEDURE — 99254 IP/OBS CNSLTJ NEW/EST MOD 60: CPT | Performed by: INTERNAL MEDICINE

## 2019-08-28 PROCEDURE — 85025 COMPLETE CBC W/AUTO DIFF WBC: CPT | Performed by: INTERNAL MEDICINE

## 2019-08-28 PROCEDURE — 25010000002 FENTANYL CITRATE (PF) 100 MCG/2ML SOLUTION: Performed by: INTERNAL MEDICINE

## 2019-08-28 PROCEDURE — 84484 ASSAY OF TROPONIN QUANT: CPT | Performed by: FAMILY MEDICINE

## 2019-08-28 PROCEDURE — C1887 CATHETER, GUIDING: HCPCS | Performed by: INTERNAL MEDICINE

## 2019-08-28 PROCEDURE — B2131ZZ FLUOROSCOPY OF MULTIPLE CORONARY ARTERY BYPASS GRAFTS USING LOW OSMOLAR CONTRAST: ICD-10-PCS | Performed by: INTERNAL MEDICINE

## 2019-08-28 DEVICE — XIENCE SIERRA™ EVEROLIMUS ELUTING CORONARY STENT SYSTEM 3.50 MM X 08 MM / RAPID-EXCHANGE
Type: IMPLANTABLE DEVICE | Status: FUNCTIONAL
Brand: XIENCE SIERRA™

## 2019-08-28 RX ORDER — MIDAZOLAM HYDROCHLORIDE 1 MG/ML
INJECTION INTRAMUSCULAR; INTRAVENOUS AS NEEDED
Status: DISCONTINUED | OUTPATIENT
Start: 2019-08-28 | End: 2019-08-28 | Stop reason: HOSPADM

## 2019-08-28 RX ORDER — ASPIRIN 81 MG/1
81 TABLET, CHEWABLE ORAL DAILY
Status: DISCONTINUED | OUTPATIENT
Start: 2019-08-28 | End: 2019-08-30 | Stop reason: HOSPADM

## 2019-08-28 RX ORDER — MORPHINE SULFATE 4 MG/ML
2 INJECTION, SOLUTION INTRAMUSCULAR; INTRAVENOUS EVERY 4 HOURS PRN
Status: DISCONTINUED | OUTPATIENT
Start: 2019-08-28 | End: 2019-08-28

## 2019-08-28 RX ORDER — OXYCODONE HCL 20 MG/1
20 TABLET, FILM COATED, EXTENDED RELEASE ORAL EVERY 12 HOURS SCHEDULED
Status: DISCONTINUED | OUTPATIENT
Start: 2019-08-28 | End: 2019-08-29

## 2019-08-28 RX ORDER — FENTANYL CITRATE 50 UG/ML
25 INJECTION, SOLUTION INTRAMUSCULAR; INTRAVENOUS ONCE
Status: DISCONTINUED | OUTPATIENT
Start: 2019-08-28 | End: 2019-08-30 | Stop reason: HOSPADM

## 2019-08-28 RX ORDER — PRASUGREL 10 MG/1
10 TABLET, FILM COATED ORAL DAILY
Status: DISCONTINUED | OUTPATIENT
Start: 2019-08-28 | End: 2019-08-30 | Stop reason: HOSPADM

## 2019-08-28 RX ORDER — LIDOCAINE HYDROCHLORIDE 20 MG/ML
INJECTION, SOLUTION INFILTRATION; PERINEURAL AS NEEDED
Status: DISCONTINUED | OUTPATIENT
Start: 2019-08-28 | End: 2019-08-28 | Stop reason: HOSPADM

## 2019-08-28 RX ORDER — SODIUM CHLORIDE 9 MG/ML
250 INJECTION, SOLUTION INTRAVENOUS ONCE AS NEEDED
Status: DISCONTINUED | OUTPATIENT
Start: 2019-08-28 | End: 2019-08-30 | Stop reason: HOSPADM

## 2019-08-28 RX ORDER — MORPHINE SULFATE 4 MG/ML
2 INJECTION, SOLUTION INTRAMUSCULAR; INTRAVENOUS
Status: DISCONTINUED | OUTPATIENT
Start: 2019-08-28 | End: 2019-08-30 | Stop reason: HOSPADM

## 2019-08-28 RX ORDER — MIDAZOLAM HYDROCHLORIDE 1 MG/ML
1 INJECTION INTRAMUSCULAR; INTRAVENOUS ONCE
Status: DISCONTINUED | OUTPATIENT
Start: 2019-08-28 | End: 2019-08-30 | Stop reason: HOSPADM

## 2019-08-28 RX ORDER — CLONIDINE HYDROCHLORIDE 0.1 MG/1
0.1 TABLET ORAL 2 TIMES DAILY
Status: DISCONTINUED | OUTPATIENT
Start: 2019-08-28 | End: 2019-08-30 | Stop reason: HOSPADM

## 2019-08-28 RX ORDER — ATROPINE SULFATE 1 MG/ML
.5-1 INJECTION, SOLUTION INTRAMUSCULAR; INTRAVENOUS; SUBCUTANEOUS
Status: DISCONTINUED | OUTPATIENT
Start: 2019-08-28 | End: 2019-08-30 | Stop reason: HOSPADM

## 2019-08-28 RX ORDER — POTASSIUM CHLORIDE 20 MEQ/1
20 TABLET, EXTENDED RELEASE ORAL DAILY
Status: DISCONTINUED | OUTPATIENT
Start: 2019-08-28 | End: 2019-08-30 | Stop reason: HOSPADM

## 2019-08-28 RX ORDER — SODIUM CHLORIDE 9 MG/ML
75 INJECTION, SOLUTION INTRAVENOUS CONTINUOUS
Status: DISCONTINUED | OUTPATIENT
Start: 2019-08-28 | End: 2019-08-30 | Stop reason: HOSPADM

## 2019-08-28 RX ORDER — PANTOPRAZOLE SODIUM 40 MG/1
40 TABLET, DELAYED RELEASE ORAL EVERY MORNING
Status: DISCONTINUED | OUTPATIENT
Start: 2019-08-28 | End: 2019-08-30 | Stop reason: HOSPADM

## 2019-08-28 RX ORDER — FENTANYL CITRATE 50 UG/ML
INJECTION, SOLUTION INTRAMUSCULAR; INTRAVENOUS AS NEEDED
Status: DISCONTINUED | OUTPATIENT
Start: 2019-08-28 | End: 2019-08-28 | Stop reason: HOSPADM

## 2019-08-28 RX ORDER — RANOLAZINE 500 MG/1
1000 TABLET, EXTENDED RELEASE ORAL EVERY 12 HOURS SCHEDULED
Status: DISCONTINUED | OUTPATIENT
Start: 2019-08-28 | End: 2019-08-30 | Stop reason: HOSPADM

## 2019-08-28 RX ORDER — SODIUM CHLORIDE 9 MG/ML
1-3 INJECTION, SOLUTION INTRAVENOUS CONTINUOUS
Status: DISCONTINUED | OUTPATIENT
Start: 2019-08-28 | End: 2019-08-30 | Stop reason: HOSPADM

## 2019-08-28 RX ORDER — HYDROCODONE BITARTRATE AND ACETAMINOPHEN 10; 325 MG/1; MG/1
1 TABLET ORAL EVERY 6 HOURS PRN
Status: DISCONTINUED | OUTPATIENT
Start: 2019-08-28 | End: 2019-08-28

## 2019-08-28 RX ORDER — HEPARIN SODIUM 1000 [USP'U]/ML
INJECTION, SOLUTION INTRAVENOUS; SUBCUTANEOUS AS NEEDED
Status: DISCONTINUED | OUTPATIENT
Start: 2019-08-28 | End: 2019-08-28 | Stop reason: HOSPADM

## 2019-08-28 RX ORDER — FUROSEMIDE 20 MG/1
20 TABLET ORAL DAILY
Status: DISCONTINUED | OUTPATIENT
Start: 2019-08-28 | End: 2019-08-30 | Stop reason: HOSPADM

## 2019-08-28 RX ORDER — ROSUVASTATIN CALCIUM 10 MG/1
20 TABLET, COATED ORAL DAILY
Status: DISCONTINUED | OUTPATIENT
Start: 2019-08-28 | End: 2019-08-30 | Stop reason: HOSPADM

## 2019-08-28 RX ADMIN — PANTOPRAZOLE SODIUM 40 MG: 40 TABLET, DELAYED RELEASE ORAL at 08:14

## 2019-08-28 RX ADMIN — MORPHINE SULFATE 2 MG: 4 INJECTION INTRAVENOUS at 08:13

## 2019-08-28 RX ADMIN — CLONIDINE HYDROCHLORIDE 0.1 MG: 0.1 TABLET ORAL at 21:44

## 2019-08-28 RX ADMIN — FUROSEMIDE 20 MG: 20 TABLET ORAL at 08:14

## 2019-08-28 RX ADMIN — SODIUM CHLORIDE 75 ML/HR: 900 INJECTION, SOLUTION INTRAVENOUS at 17:35

## 2019-08-28 RX ADMIN — RANOLAZINE 1000 MG: 500 TABLET, FILM COATED, EXTENDED RELEASE ORAL at 21:44

## 2019-08-28 RX ADMIN — ROSUVASTATIN CALCIUM 20 MG: 10 TABLET, FILM COATED ORAL at 08:14

## 2019-08-28 RX ADMIN — OXYCODONE HYDROCHLORIDE 20 MG: 20 TABLET, FILM COATED, EXTENDED RELEASE ORAL at 21:44

## 2019-08-28 RX ADMIN — CLONIDINE HYDROCHLORIDE 0.1 MG: 0.1 TABLET ORAL at 08:14

## 2019-08-28 RX ADMIN — OXYCODONE HYDROCHLORIDE 20 MG: 20 TABLET, FILM COATED, EXTENDED RELEASE ORAL at 09:07

## 2019-08-28 RX ADMIN — ASPIRIN 81 MG 81 MG: 81 TABLET ORAL at 08:14

## 2019-08-28 RX ADMIN — POTASSIUM CHLORIDE 20 MEQ: 20 TABLET, EXTENDED RELEASE ORAL at 08:14

## 2019-08-28 RX ADMIN — PRASUGREL 10 MG: 10 TABLET, FILM COATED ORAL at 08:14

## 2019-08-28 RX ADMIN — MORPHINE SULFATE 2 MG: 4 INJECTION INTRAVENOUS at 14:23

## 2019-08-28 RX ADMIN — RANOLAZINE 1000 MG: 500 TABLET, FILM COATED, EXTENDED RELEASE ORAL at 08:17

## 2019-08-29 LAB
ANION GAP SERPL CALCULATED.3IONS-SCNC: 15.1 MMOL/L (ref 5–15)
BASOPHILS # BLD AUTO: 0 10*3/MM3 (ref 0–0.2)
BASOPHILS NFR BLD AUTO: 0.3 % (ref 0–1.5)
BUN BLD-MCNC: 10 MG/DL (ref 8–20)
BUN/CREAT SERPL: 11.1 (ref 6.2–20.3)
CALCIUM SPEC-SCNC: 8.7 MG/DL (ref 8.9–10.3)
CHLORIDE SERPL-SCNC: 101 MMOL/L (ref 101–111)
CO2 SERPL-SCNC: 23 MMOL/L (ref 22–32)
CREAT BLD-MCNC: 0.9 MG/DL (ref 0.7–1.2)
DEPRECATED RDW RBC AUTO: 46.4 FL (ref 37–54)
EOSINOPHIL # BLD AUTO: 0 10*3/MM3 (ref 0–0.4)
EOSINOPHIL NFR BLD AUTO: 0.4 % (ref 0.3–6.2)
ERYTHROCYTE [DISTWIDTH] IN BLOOD BY AUTOMATED COUNT: 13.3 % (ref 12.3–15.4)
GFR SERPL CREATININE-BSD FRML MDRD: 86 ML/MIN/1.73
GLUCOSE BLD-MCNC: 143 MG/DL (ref 65–99)
GLUCOSE BLDC GLUCOMTR-MCNC: 103 MG/DL (ref 70–105)
GLUCOSE BLDC GLUCOMTR-MCNC: 105 MG/DL (ref 70–105)
GLUCOSE BLDC GLUCOMTR-MCNC: 122 MG/DL (ref 70–105)
GLUCOSE BLDC GLUCOMTR-MCNC: 145 MG/DL (ref 70–105)
HCT VFR BLD AUTO: 43.8 % (ref 37.5–51)
HGB BLD-MCNC: 15.5 G/DL (ref 13–17.7)
LYMPHOCYTES # BLD AUTO: 2.3 10*3/MM3 (ref 0.7–3.1)
LYMPHOCYTES NFR BLD AUTO: 28.6 % (ref 19.6–45.3)
MCH RBC QN AUTO: 35.1 PG (ref 26.6–33)
MCHC RBC AUTO-ENTMCNC: 35.2 G/DL (ref 31.5–35.7)
MCV RBC AUTO: 99.5 FL (ref 79–97)
MONOCYTES # BLD AUTO: 0.7 10*3/MM3 (ref 0.1–0.9)
MONOCYTES NFR BLD AUTO: 8.6 % (ref 5–12)
NEUTROPHILS # BLD AUTO: 5.1 10*3/MM3 (ref 1.7–7)
NEUTROPHILS NFR BLD AUTO: 62.1 % (ref 42.7–76)
NRBC BLD AUTO-RTO: 0 /100 WBC (ref 0–0.2)
PLATELET # BLD AUTO: 185 10*3/MM3 (ref 140–450)
PMV BLD AUTO: 7.2 FL (ref 6–12)
POTASSIUM BLD-SCNC: 4.1 MMOL/L (ref 3.6–5.1)
RBC # BLD AUTO: 4.4 10*6/MM3 (ref 4.14–5.8)
SODIUM BLD-SCNC: 135 MMOL/L (ref 136–144)
WBC NRBC COR # BLD: 8.2 10*3/MM3 (ref 3.4–10.8)

## 2019-08-29 PROCEDURE — 93005 ELECTROCARDIOGRAM TRACING: CPT | Performed by: INTERNAL MEDICINE

## 2019-08-29 PROCEDURE — 85025 COMPLETE CBC W/AUTO DIFF WBC: CPT | Performed by: FAMILY MEDICINE

## 2019-08-29 PROCEDURE — 82962 GLUCOSE BLOOD TEST: CPT

## 2019-08-29 PROCEDURE — 80048 BASIC METABOLIC PNL TOTAL CA: CPT | Performed by: FAMILY MEDICINE

## 2019-08-29 PROCEDURE — 99232 SBSQ HOSP IP/OBS MODERATE 35: CPT | Performed by: INTERNAL MEDICINE

## 2019-08-29 RX ORDER — HYDROCODONE BITARTRATE AND ACETAMINOPHEN 10; 325 MG/1; MG/1
2 TABLET ORAL 4 TIMES DAILY
Status: DISCONTINUED | OUTPATIENT
Start: 2019-08-29 | End: 2019-08-30 | Stop reason: HOSPADM

## 2019-08-29 RX ORDER — CLOTRIMAZOLE AND BETAMETHASONE DIPROPIONATE 10; .64 MG/G; MG/G
CREAM TOPICAL EVERY 12 HOURS SCHEDULED
Status: DISCONTINUED | OUTPATIENT
Start: 2019-08-29 | End: 2019-08-30 | Stop reason: HOSPADM

## 2019-08-29 RX ADMIN — ROSUVASTATIN CALCIUM 20 MG: 10 TABLET, FILM COATED ORAL at 08:27

## 2019-08-29 RX ADMIN — FUROSEMIDE 20 MG: 20 TABLET ORAL at 08:27

## 2019-08-29 RX ADMIN — Medication 10 ML: at 08:28

## 2019-08-29 RX ADMIN — POTASSIUM CHLORIDE 20 MEQ: 20 TABLET, EXTENDED RELEASE ORAL at 08:27

## 2019-08-29 RX ADMIN — HYDROCODONE BITARTRATE AND ACETAMINOPHEN 2 TABLET: 10; 325 TABLET ORAL at 17:41

## 2019-08-29 RX ADMIN — CLONIDINE HYDROCHLORIDE 0.1 MG: 0.1 TABLET ORAL at 20:56

## 2019-08-29 RX ADMIN — HYDROCODONE BITARTRATE AND ACETAMINOPHEN 2 TABLET: 10; 325 TABLET ORAL at 12:15

## 2019-08-29 RX ADMIN — HYDROCODONE BITARTRATE AND ACETAMINOPHEN 2 TABLET: 10; 325 TABLET ORAL at 08:27

## 2019-08-29 RX ADMIN — RANOLAZINE 1000 MG: 500 TABLET, FILM COATED, EXTENDED RELEASE ORAL at 08:27

## 2019-08-29 RX ADMIN — HYDROCODONE BITARTRATE AND ACETAMINOPHEN 2 TABLET: 10; 325 TABLET ORAL at 20:56

## 2019-08-29 RX ADMIN — CLOTRIMAZOLE AND BETAMETHASONE DIPROPIONATE: 10; .64 CREAM TOPICAL at 10:06

## 2019-08-29 RX ADMIN — PANTOPRAZOLE SODIUM 40 MG: 40 TABLET, DELAYED RELEASE ORAL at 06:06

## 2019-08-29 RX ADMIN — CLOTRIMAZOLE AND BETAMETHASONE DIPROPIONATE: 10; .64 CREAM TOPICAL at 20:56

## 2019-08-29 RX ADMIN — PRASUGREL 10 MG: 10 TABLET, FILM COATED ORAL at 08:27

## 2019-08-29 RX ADMIN — ASPIRIN 81 MG 81 MG: 81 TABLET ORAL at 08:27

## 2019-08-29 RX ADMIN — RANOLAZINE 1000 MG: 500 TABLET, FILM COATED, EXTENDED RELEASE ORAL at 20:56

## 2019-08-29 RX ADMIN — CLONIDINE HYDROCHLORIDE 0.1 MG: 0.1 TABLET ORAL at 08:28

## 2019-08-30 VITALS
HEART RATE: 71 BPM | RESPIRATION RATE: 17 BRPM | SYSTOLIC BLOOD PRESSURE: 132 MMHG | DIASTOLIC BLOOD PRESSURE: 75 MMHG | OXYGEN SATURATION: 100 % | TEMPERATURE: 97.4 F | HEIGHT: 69 IN | BODY MASS INDEX: 32.88 KG/M2 | WEIGHT: 222 LBS

## 2019-08-30 LAB
ACT BLD: 158 SECONDS (ref 89–137)
ANION GAP SERPL CALCULATED.3IONS-SCNC: 16.8 MMOL/L (ref 5–15)
BASOPHILS # BLD AUTO: 0 10*3/MM3 (ref 0–0.2)
BASOPHILS NFR BLD AUTO: 0.4 % (ref 0–1.5)
BUN BLD-MCNC: 11 MG/DL (ref 8–20)
BUN/CREAT SERPL: 13.8 (ref 6.2–20.3)
CALCIUM SPEC-SCNC: 8.7 MG/DL (ref 8.9–10.3)
CHLORIDE SERPL-SCNC: 104 MMOL/L (ref 101–111)
CO2 SERPL-SCNC: 22 MMOL/L (ref 22–32)
CREAT BLD-MCNC: 0.8 MG/DL (ref 0.7–1.2)
DEPRECATED RDW RBC AUTO: 45.5 FL (ref 37–54)
EOSINOPHIL # BLD AUTO: 0.1 10*3/MM3 (ref 0–0.4)
EOSINOPHIL NFR BLD AUTO: 1.4 % (ref 0.3–6.2)
ERYTHROCYTE [DISTWIDTH] IN BLOOD BY AUTOMATED COUNT: 13.1 % (ref 12.3–15.4)
GFR SERPL CREATININE-BSD FRML MDRD: 98 ML/MIN/1.73
GLUCOSE BLD-MCNC: 118 MG/DL (ref 65–99)
HCT VFR BLD AUTO: 41.1 % (ref 37.5–51)
HGB BLD-MCNC: 14.2 G/DL (ref 13–17.7)
LYMPHOCYTES # BLD AUTO: 2.6 10*3/MM3 (ref 0.7–3.1)
LYMPHOCYTES NFR BLD AUTO: 39.9 % (ref 19.6–45.3)
MCH RBC QN AUTO: 34.5 PG (ref 26.6–33)
MCHC RBC AUTO-ENTMCNC: 34.6 G/DL (ref 31.5–35.7)
MCV RBC AUTO: 99.5 FL (ref 79–97)
MONOCYTES # BLD AUTO: 0.8 10*3/MM3 (ref 0.1–0.9)
MONOCYTES NFR BLD AUTO: 12.9 % (ref 5–12)
NEUTROPHILS # BLD AUTO: 3 10*3/MM3 (ref 1.7–7)
NEUTROPHILS NFR BLD AUTO: 45.4 % (ref 42.7–76)
NRBC BLD AUTO-RTO: 0.1 /100 WBC (ref 0–0.2)
PLATELET # BLD AUTO: 168 10*3/MM3 (ref 140–450)
PMV BLD AUTO: 7.4 FL (ref 6–12)
POTASSIUM BLD-SCNC: 3.8 MMOL/L (ref 3.6–5.1)
RBC # BLD AUTO: 4.13 10*6/MM3 (ref 4.14–5.8)
SODIUM BLD-SCNC: 139 MMOL/L (ref 136–144)
WBC NRBC COR # BLD: 6.5 10*3/MM3 (ref 3.4–10.8)

## 2019-08-30 PROCEDURE — 99232 SBSQ HOSP IP/OBS MODERATE 35: CPT | Performed by: INTERNAL MEDICINE

## 2019-08-30 PROCEDURE — 80048 BASIC METABOLIC PNL TOTAL CA: CPT | Performed by: FAMILY MEDICINE

## 2019-08-30 PROCEDURE — 85025 COMPLETE CBC W/AUTO DIFF WBC: CPT | Performed by: FAMILY MEDICINE

## 2019-08-30 RX ORDER — LOSARTAN POTASSIUM 25 MG/1
25 TABLET ORAL
Status: DISCONTINUED | OUTPATIENT
Start: 2019-08-30 | End: 2019-08-30 | Stop reason: HOSPADM

## 2019-08-30 RX ORDER — CLOTRIMAZOLE AND BETAMETHASONE DIPROPIONATE 10; .64 MG/G; MG/G
CREAM TOPICAL EVERY 12 HOURS SCHEDULED
Qty: 90 G | Refills: 3 | Status: SHIPPED | OUTPATIENT
Start: 2019-08-30 | End: 2019-09-08

## 2019-08-30 RX ORDER — LOSARTAN POTASSIUM 25 MG/1
25 TABLET ORAL
Qty: 30 TABLET | Refills: 3 | Status: SHIPPED | OUTPATIENT
Start: 2019-08-30

## 2019-08-30 RX ADMIN — RANOLAZINE 1000 MG: 500 TABLET, FILM COATED, EXTENDED RELEASE ORAL at 09:04

## 2019-08-30 RX ADMIN — LOSARTAN POTASSIUM 25 MG: 25 TABLET, FILM COATED ORAL at 09:05

## 2019-08-30 RX ADMIN — PRASUGREL 10 MG: 10 TABLET, FILM COATED ORAL at 09:04

## 2019-08-30 RX ADMIN — ROSUVASTATIN CALCIUM 20 MG: 10 TABLET, FILM COATED ORAL at 09:04

## 2019-08-30 RX ADMIN — HYDROCODONE BITARTRATE AND ACETAMINOPHEN 2 TABLET: 10; 325 TABLET ORAL at 07:03

## 2019-08-30 RX ADMIN — Medication 10 ML: at 09:05

## 2019-08-30 RX ADMIN — POTASSIUM CHLORIDE 20 MEQ: 20 TABLET, EXTENDED RELEASE ORAL at 09:04

## 2019-08-30 RX ADMIN — CLONIDINE HYDROCHLORIDE 0.1 MG: 0.1 TABLET ORAL at 09:05

## 2019-08-30 RX ADMIN — FUROSEMIDE 20 MG: 20 TABLET ORAL at 09:05

## 2019-08-30 RX ADMIN — CLOTRIMAZOLE AND BETAMETHASONE DIPROPIONATE: 10; .64 CREAM TOPICAL at 09:06

## 2019-08-30 RX ADMIN — HYDROCODONE BITARTRATE AND ACETAMINOPHEN 2 TABLET: 10; 325 TABLET ORAL at 11:14

## 2019-08-30 RX ADMIN — PANTOPRAZOLE SODIUM 40 MG: 40 TABLET, DELAYED RELEASE ORAL at 06:10

## 2019-08-30 RX ADMIN — ASPIRIN 81 MG 81 MG: 81 TABLET ORAL at 09:05

## 2019-08-31 ENCOUNTER — READMISSION MANAGEMENT (OUTPATIENT)
Dept: CALL CENTER | Facility: HOSPITAL | Age: 64
End: 2019-08-31

## 2019-08-31 NOTE — OUTREACH NOTE
Prep Survey      Responses   Facility patient discharged from?  [facility]   Is patient eligible?  Yes   Discharge diagnosis  Non-STEMI    Does the patient have one of the following disease processes/diagnoses(primary or secondary)?  Acute MI (STEMI,NSTEMI)   Does the patient have Home health ordered?  No   Is there a DME ordered?  No   Prep survey completed?  Yes          Lashon Martinez RN

## 2019-09-04 ENCOUNTER — READMISSION MANAGEMENT (OUTPATIENT)
Dept: CALL CENTER | Facility: HOSPITAL | Age: 64
End: 2019-09-04

## 2019-09-04 ENCOUNTER — TELEPHONE (OUTPATIENT)
Dept: CARDIAC REHAB | Facility: HOSPITAL | Age: 64
End: 2019-09-04

## 2019-09-04 NOTE — OUTREACH NOTE
AMI Week 1 Survey      Responses   Facility patient discharged from?  [facility]   Does the patient have one of the following disease processes/diagnoses(primary or secondary)?  Acute MI (STEMI,NSTEMI)   Is there a successful TCM telephone encounter documented?  No   Week 1 attempt successful?  Yes   Call start time  0818   Call end time  0822   Discharge diagnosis  Non-STEMI    Meds reviewed with patient/caregiver?  Yes   Is the patient having any side effects they believe may be caused by any medication additions or changes?  No   Does the patient have all prescriptions related to this admission filled (includes statins,anticoagulants,HTN meds,anti-arrhythmia meds)  Yes   Is the patient taking all medications as directed (includes completed medication regime)?  Yes   Does the patient have a primary care provider?   Yes   Does the patient have an appointment with their PCP,cardiologist,or clinic within 7 days of discharge?  Greater than 7 days   What is preventing the patient from scheduling follow up appointments within 7 days of discharge?  Earlier appointment not available   Nursing Interventions  Verified appointment date/time/provider   Has the patient kept scheduled appointments due by today?  N/A   Has home health visited the patient within 72 hours of discharge?  N/A   Did the patient receive a copy of their discharge instructions?  Yes   Nursing interventions  Reviewed instructions with patient   What is the patient's perception of their health status since discharge?  Improving   Nursing interventions  Nurse provided patient education   Is the patient/caregiver able to teach back signs and symptoms of when to call for help immediately:  Sudden chest discomfort, Sudden discomfort in arms, back, neck or jaw, Shortness of breath at any time, Sudden sweating or clammy skin, Nausea or vomiting, Dizziness or lightheadedness, Irregular or rapid heart rate   Nursing interventions  Nurse provided patient education   Is  the pateint /caregiver able to teach back the importance of cardiac rehab?  Yes   Nursing interventions  Provided education on importance of cardiac rehab   Is the patient/caregiver able to teach back lifestyle changes to help prevent MIs  Regular exercise as approved by provider, Heart healthy diet, Maintaining a healthy weight, Reducing stress   Is the patient/caregiver able to teach back ways to prevent a second heart attack:  Take medications, Follow up with MD, Participate in Cardiac Rehab, Manage risk factors   Is the patient/caregiver able to teach back the hierarchy of who to call/visit for symptoms/problems? PCP, Specialist, Home health nurse, Urgent Care, ED, 911  Yes   Week 1 call completed?  Yes   Revoked  No further contact(revokes)-requires comment          Rachael Valdez LPN

## 2019-09-04 NOTE — TELEPHONE ENCOUNTER
Called pt to follow up and see if interested in Cardiac Rehab program. States he is interested. Informed we will run insurance info and get order from Dr. Palmer, then call him back to schedule.

## 2019-09-10 ENCOUNTER — TELEPHONE (OUTPATIENT)
Dept: CARDIAC REHAB | Facility: HOSPITAL | Age: 64
End: 2019-09-10

## 2019-09-19 ENCOUNTER — TRANSCRIBE ORDERS (OUTPATIENT)
Dept: ADMINISTRATIVE | Facility: HOSPITAL | Age: 64
End: 2019-09-19

## 2019-09-19 ENCOUNTER — OFFICE VISIT (OUTPATIENT)
Dept: CARDIAC REHAB | Facility: HOSPITAL | Age: 64
End: 2019-09-19

## 2019-09-19 DIAGNOSIS — Z95.820 S/P ANGIOPLASTY WITH STENT: ICD-10-CM

## 2019-09-19 DIAGNOSIS — I21.4 NON-STEMI (NON-ST ELEVATED MYOCARDIAL INFARCTION) (HCC): Primary | ICD-10-CM

## 2019-09-19 DIAGNOSIS — R42 DIZZINESS: Primary | ICD-10-CM

## 2019-09-19 PROCEDURE — 93798 PHYS/QHP OP CAR RHAB W/ECG: CPT

## 2019-09-23 ENCOUNTER — TREATMENT (OUTPATIENT)
Dept: CARDIAC REHAB | Facility: HOSPITAL | Age: 64
End: 2019-09-23

## 2019-09-23 DIAGNOSIS — I21.4 NON-STEMI (NON-ST ELEVATED MYOCARDIAL INFARCTION) (HCC): Primary | ICD-10-CM

## 2019-09-23 DIAGNOSIS — Z95.820 S/P ANGIOPLASTY WITH STENT: ICD-10-CM

## 2019-09-23 PROCEDURE — 93798 PHYS/QHP OP CAR RHAB W/ECG: CPT

## 2019-09-25 ENCOUNTER — HOSPITAL ENCOUNTER (OUTPATIENT)
Dept: CARDIOLOGY | Facility: HOSPITAL | Age: 64
Discharge: HOME | End: 2019-09-25
Admitting: FAMILY MEDICINE

## 2019-09-25 ENCOUNTER — TREATMENT (OUTPATIENT)
Dept: CARDIAC REHAB | Facility: HOSPITAL | Age: 64
End: 2019-09-25

## 2019-09-25 DIAGNOSIS — R42 DIZZINESS: ICD-10-CM

## 2019-09-25 DIAGNOSIS — I21.4 NON-STEMI (NON-ST ELEVATED MYOCARDIAL INFARCTION) (HCC): Primary | ICD-10-CM

## 2019-09-25 LAB
BH CV XLRA MEAS LEFT CCA RATIO VEL: 98.8 CM/SEC
BH CV XLRA MEAS LEFT DIST CCA EDV: -26.1 CM/SEC
BH CV XLRA MEAS LEFT DIST CCA PSV: -91 CM/SEC
BH CV XLRA MEAS LEFT DIST ICA EDV: -30.3 CM/SEC
BH CV XLRA MEAS LEFT DIST ICA PSV: -72.8 CM/SEC
BH CV XLRA MEAS LEFT ICA RATIO VEL: -72.8 CM/SEC
BH CV XLRA MEAS LEFT ICA/CCA RATIO: -0.74
BH CV XLRA MEAS LEFT PROX CCA EDV: 27.1 CM/SEC
BH CV XLRA MEAS LEFT PROX CCA PSV: 98.8 CM/SEC
BH CV XLRA MEAS LEFT PROX ECA EDV: -25.2 CM/SEC
BH CV XLRA MEAS LEFT PROX ECA PSV: -93.9 CM/SEC
BH CV XLRA MEAS LEFT PROX ICA EDV: -27.7 CM/SEC
BH CV XLRA MEAS LEFT PROX ICA PSV: -58.9 CM/SEC
BH CV XLRA MEAS LEFT PROX SCLA EDV: 1.2 CM/SEC
BH CV XLRA MEAS LEFT PROX SCLA PSV: 199 CM/SEC
BH CV XLRA MEAS LEFT VERTEBRAL A EDV: -16.2 CM/SEC
BH CV XLRA MEAS LEFT VERTEBRAL A PSV: -48.5 CM/SEC
BH CV XLRA MEAS RIGHT CCA RATIO VEL: 93.2 CM/SEC
BH CV XLRA MEAS RIGHT DIST CCA EDV: 21 CM/SEC
BH CV XLRA MEAS RIGHT DIST CCA PSV: 87.6 CM/SEC
BH CV XLRA MEAS RIGHT DIST ICA EDV: -33.7 CM/SEC
BH CV XLRA MEAS RIGHT DIST ICA PSV: -83.9 CM/SEC
BH CV XLRA MEAS RIGHT ICA RATIO VEL: -84.7 CM/SEC
BH CV XLRA MEAS RIGHT ICA/CCA RATIO: -0.91
BH CV XLRA MEAS RIGHT PROX CCA EDV: 14.7 CM/SEC
BH CV XLRA MEAS RIGHT PROX CCA PSV: 93.2 CM/SEC
BH CV XLRA MEAS RIGHT PROX ECA EDV: -14.9 CM/SEC
BH CV XLRA MEAS RIGHT PROX ECA PSV: -108 CM/SEC
BH CV XLRA MEAS RIGHT PROX ICA EDV: -34.5 CM/SEC
BH CV XLRA MEAS RIGHT PROX ICA PSV: -84.7 CM/SEC
BH CV XLRA MEAS RIGHT PROX SCLA EDV: 0.97 CM/SEC
BH CV XLRA MEAS RIGHT PROX SCLA PSV: 147 CM/SEC
BH CV XLRA MEAS RIGHT VERTEBRAL A EDV: 9.3 CM/SEC
BH CV XLRA MEAS RIGHT VERTEBRAL A PSV: 33.9 CM/SEC

## 2019-09-25 PROCEDURE — 93798 PHYS/QHP OP CAR RHAB W/ECG: CPT

## 2019-09-25 PROCEDURE — 93880 EXTRACRANIAL BILAT STUDY: CPT

## 2019-09-26 ENCOUNTER — TREATMENT (OUTPATIENT)
Dept: CARDIAC REHAB | Facility: HOSPITAL | Age: 64
End: 2019-09-26

## 2019-09-26 ENCOUNTER — OFFICE VISIT (OUTPATIENT)
Dept: CARDIOLOGY | Facility: CLINIC | Age: 64
End: 2019-09-26

## 2019-09-26 VITALS
WEIGHT: 220 LBS | HEIGHT: 69 IN | HEART RATE: 68 BPM | OXYGEN SATURATION: 96 % | DIASTOLIC BLOOD PRESSURE: 84 MMHG | SYSTOLIC BLOOD PRESSURE: 137 MMHG | BODY MASS INDEX: 32.58 KG/M2

## 2019-09-26 DIAGNOSIS — I25.700 CORONARY ARTERY DISEASE INVOLVING CORONARY BYPASS GRAFT OF NATIVE HEART WITH UNSTABLE ANGINA PECTORIS (HCC): Primary | ICD-10-CM

## 2019-09-26 DIAGNOSIS — E78.00 PURE HYPERCHOLESTEROLEMIA: ICD-10-CM

## 2019-09-26 DIAGNOSIS — I10 ESSENTIAL HYPERTENSION: ICD-10-CM

## 2019-09-26 DIAGNOSIS — Z95.820 S/P ANGIOPLASTY WITH STENT: ICD-10-CM

## 2019-09-26 DIAGNOSIS — I21.4 NON-STEMI (NON-ST ELEVATED MYOCARDIAL INFARCTION) (HCC): Primary | ICD-10-CM

## 2019-09-26 PROCEDURE — 99213 OFFICE O/P EST LOW 20 MIN: CPT | Performed by: INTERNAL MEDICINE

## 2019-09-26 PROCEDURE — 93798 PHYS/QHP OP CAR RHAB W/ECG: CPT

## 2019-09-26 NOTE — PROGRESS NOTES
Subjective:     Encounter Date:09/26/2019      Patient ID: Ravin Praker is a 64 y.o. male.    Chief Complaint:  History of Present Illness 64-year-old white male with history of coronary disease status post coronary bypass surgery history of hypertension hyperlipidemia presents to my office for follow-up.  Patient is currently stable without any symptoms of chest pain or shortness of breath at rest on exertion.  No complaints of any PND orthopnea.  No palpitations dizziness syncope or swelling of the feet.  Patient has been taking his medicines regularly.  Recently underwent cardiac catheterization followed by stent placement to the saphenous graft to the marginal branch of the circumflex artery.  He is also trying to watch his diet and follow a good exercise.  The following portions of the patient's history were reviewed and updated as appropriate: allergies, current medications, past family history, past medical history, past social history, past surgical history and problem list.  Past Medical History:   Diagnosis Date   • Acute renal failure (ARF) (CMS/HCC)    • Anemia    • Arthritis    • CAD (coronary artery disease)    • Coronary artery disease    • Degenerative disc disease, thoracic    • Diabetes mellitus (CMS/HCC)    • Dyslipidemia associated with type 2 diabetes mellitus (CMS/HCC)    • Elevated cholesterol    • Gastroesophageal reflux disease 8/28/2019   • Hepatitis C    • Hx of CABG    • Hyperlipidemia    • Hypertension    • Hyponatremia      Past Surgical History:   Procedure Laterality Date   • CARDIAC CATHETERIZATION     • CARDIAC CATHETERIZATION N/A 8/28/2019    Procedure: Left Heart Cath and coronary angiogram;  Surgeon: Aakash Palmer MD;  Location: [facility] CATH INVASIVE LOCATION;  Service: Cardiovascular   • CARDIAC SURGERY     • CHOLECYSTECTOMY     • COLON SURGERY     • COLONOSCOPY     • KIDNEY STONE SURGERY     • TONSILLECTOMY       /84 (BP Location: Left arm, Patient Position:  "Sitting)   Pulse 68   Ht 175.3 cm (69\")   Wt 99.8 kg (220 lb)   SpO2 96%   BMI 32.49 kg/m²   Family History   Problem Relation Age of Onset   • Cancer Mother    • Diabetes Father    • Kidney disease Father    • Hypothyroidism Father    • Hypothyroidism Sister    • COPD Brother        Current Outpatient Medications:   •  aspirin 81 MG chewable tablet, Chew 81 mg Daily., Disp: , Rfl:   •  cholecalciferol (VITAMIN D3) 1000 units tablet, Take 2,000 Units by mouth Daily., Disp: , Rfl:   •  CloNIDine (CATAPRES) 0.1 MG tablet, Take 0.1 mg by mouth 2 (Two) Times a Day., Disp: , Rfl:   •  colestipol (COLESTID) 1 g tablet, Take 1 g by mouth As Needed (Take up to 6 tablets Daily)., Disp: , Rfl:   •  diphenoxylate-atropine (LOMOTIL) 2.5-0.025 MG per tablet, Take 1 tablet by mouth As Needed for Diarrhea., Disp: , Rfl:   •  folic acid-vit B6-vit B12 (FOLTABS) 0.8-10-0.115 MG tablet tablet, Take  by mouth Daily., Disp: , Rfl:   •  furosemide (LASIX) 20 MG tablet, Take 20 mg by mouth Daily., Disp: , Rfl:   •  isosorbide mononitrate (IMDUR) 30 MG 24 hr tablet, Take 30 mg by mouth Daily., Disp: , Rfl:   •  losartan (COZAAR) 25 MG tablet, Take 1 tablet by mouth Daily., Disp: 30 tablet, Rfl: 3  •  metoprolol tartrate (LOPRESSOR) 25 MG tablet, Take 25 mg by mouth 2 (Two) Times a Day., Disp: , Rfl:   •  Multiple Vitamins-Minerals (MULTIVITAMIN WITH MINERALS) tablet tablet, Take 1 tablet by mouth Daily., Disp: , Rfl:   •  nitroglycerin (NITROSTAT) 0.4 MG SL tablet, Place 0.4 mg under the tongue Every 5 (Five) Minutes As Needed for Chest Pain. Take no more than 3 doses in 15 minutes., Disp: , Rfl:   •  omeprazole (priLOSEC) 20 MG capsule, Take 20 mg by mouth Daily., Disp: , Rfl:   •  oxyCODONE ER 18 MG capsule extended-release 12 hour , Take 18 mg by mouth 2 (Two) Times a Day., Disp: , Rfl:   •  potassium chloride (K-DUR,KLOR-CON) 20 MEQ CR tablet, Take 20 mEq by mouth Daily., Disp: , Rfl:   •  prasugrel (EFFIENT) 10 MG tablet, Take " 10 mg by mouth Daily., Disp: , Rfl:   •  ranolazine (RANEXA) 500 MG 12 hr tablet, Take 1,000 mg by mouth 2 (Two) Times a Day., Disp: , Rfl:   •  rosuvastatin (CRESTOR) 20 MG tablet, Take 20 mg by mouth Daily., Disp: , Rfl:   •  venlafaxine XR (EFFEXOR-XR) 37.5 MG 24 hr capsule, Take 37.5 mg by mouth Daily., Disp: , Rfl:   •  vitamin B-6 (PYRIDOXINE) 50 MG tablet, Take 100 mg by mouth Daily., Disp: , Rfl:   No Known Allergies  Social History     Socioeconomic History   • Marital status:      Spouse name: Not on file   • Number of children: Not on file   • Years of education: Not on file   • Highest education level: Not on file   Tobacco Use   • Smoking status: Former Smoker   • Smokeless tobacco: Never Used   Substance and Sexual Activity   • Alcohol use: Yes     Alcohol/week: 4.2 oz     Types: 7 Glasses of wine per week   • Drug use: No   • Sexual activity: Defer     Review of Systems   Constitution: Negative for fever and malaise/fatigue.   Cardiovascular: Negative for chest pain, dyspnea on exertion and palpitations.   Respiratory: Negative for cough and shortness of breath.    Skin: Negative for rash.   Gastrointestinal: Positive for nausea. Negative for abdominal pain and vomiting.   Neurological: Negative for dizziness, focal weakness and headaches.   All other systems reviewed and are negative.             Objective:     Physical Exam   Constitutional: He appears well-developed and well-nourished.   HENT:   Head: Normocephalic and atraumatic.   Eyes: Conjunctivae are normal. No scleral icterus.   Neck: Normal range of motion. Neck supple. No JVD present. Carotid bruit is not present.   Cardiovascular: Normal rate, regular rhythm, S1 normal, S2 normal, normal heart sounds and intact distal pulses. PMI is not displaced.   Pulmonary/Chest: Effort normal and breath sounds normal. He has no wheezes. He has no rales.   Abdominal: Soft. Bowel sounds are normal.   Neurological: He is alert. He has normal  strength.   Skin: Skin is warm and dry. No rash noted.     Procedures    Lab Review:       Assessment:          Diagnosis Plan   1. Coronary artery disease involving coronary bypass graft of native heart with unstable angina pectoris (CMS/HCC)     2. Pure hypercholesterolemia     3. Essential hypertension            Plan:       Patient has history of coronary disease status post coronary artery bypass surgery x4 vessels.  Patient had recent cardiac catheterization which showed the SVG to the diagonal branch and to the ramus and medial branch occluded the saphenous graft to the marginal branch and a 90% disease for which he underwent a stent placement of drug-eluting stent patient also has a LIMA to the LAD which is patent  Patient's LV function is normal with EF of 50 to 50%  Blood pressure and heart rate are stable  Continue current medicines and follow in 6 months

## 2019-09-27 ENCOUNTER — APPOINTMENT (OUTPATIENT)
Dept: CARDIAC REHAB | Facility: HOSPITAL | Age: 64
End: 2019-09-27

## 2019-09-30 ENCOUNTER — TREATMENT (OUTPATIENT)
Dept: CARDIAC REHAB | Facility: HOSPITAL | Age: 64
End: 2019-09-30

## 2019-09-30 DIAGNOSIS — I21.4 NON-STEMI (NON-ST ELEVATED MYOCARDIAL INFARCTION) (HCC): Primary | ICD-10-CM

## 2019-09-30 PROCEDURE — 93798 PHYS/QHP OP CAR RHAB W/ECG: CPT

## 2019-10-02 ENCOUNTER — TREATMENT (OUTPATIENT)
Dept: CARDIAC REHAB | Facility: HOSPITAL | Age: 64
End: 2019-10-02

## 2019-10-02 DIAGNOSIS — I21.4 NON-STEMI (NON-ST ELEVATED MYOCARDIAL INFARCTION) (HCC): Primary | ICD-10-CM

## 2019-10-02 PROCEDURE — 93798 PHYS/QHP OP CAR RHAB W/ECG: CPT

## 2019-10-04 ENCOUNTER — TREATMENT (OUTPATIENT)
Dept: CARDIAC REHAB | Facility: HOSPITAL | Age: 64
End: 2019-10-04

## 2019-10-04 DIAGNOSIS — I21.4 NON-STEMI (NON-ST ELEVATED MYOCARDIAL INFARCTION) (HCC): Primary | ICD-10-CM

## 2019-10-04 DIAGNOSIS — Z95.820 S/P ANGIOPLASTY WITH STENT: ICD-10-CM

## 2019-10-04 PROCEDURE — 93798 PHYS/QHP OP CAR RHAB W/ECG: CPT

## 2019-10-06 PROCEDURE — 93010 ELECTROCARDIOGRAM REPORT: CPT | Performed by: INTERNAL MEDICINE

## 2019-10-07 ENCOUNTER — APPOINTMENT (OUTPATIENT)
Dept: CARDIAC REHAB | Facility: HOSPITAL | Age: 64
End: 2019-10-07

## 2019-10-09 ENCOUNTER — APPOINTMENT (OUTPATIENT)
Dept: CARDIAC REHAB | Facility: HOSPITAL | Age: 64
End: 2019-10-09

## 2019-10-11 ENCOUNTER — APPOINTMENT (OUTPATIENT)
Dept: CARDIAC REHAB | Facility: HOSPITAL | Age: 64
End: 2019-10-11

## 2019-10-14 ENCOUNTER — APPOINTMENT (OUTPATIENT)
Dept: CARDIAC REHAB | Facility: HOSPITAL | Age: 64
End: 2019-10-14

## 2019-10-16 ENCOUNTER — APPOINTMENT (OUTPATIENT)
Dept: CARDIAC REHAB | Facility: HOSPITAL | Age: 64
End: 2019-10-16

## 2019-10-18 ENCOUNTER — APPOINTMENT (OUTPATIENT)
Dept: CARDIAC REHAB | Facility: HOSPITAL | Age: 64
End: 2019-10-18

## 2019-10-21 ENCOUNTER — APPOINTMENT (OUTPATIENT)
Dept: CARDIAC REHAB | Facility: HOSPITAL | Age: 64
End: 2019-10-21

## 2019-10-25 ENCOUNTER — TELEPHONE (OUTPATIENT)
Dept: CARDIAC REHAB | Facility: HOSPITAL | Age: 64
End: 2019-10-25

## 2019-10-25 ENCOUNTER — APPOINTMENT (OUTPATIENT)
Dept: CARDIAC REHAB | Facility: HOSPITAL | Age: 64
End: 2019-10-25

## 2019-10-25 NOTE — TELEPHONE ENCOUNTER
Still not feeling well. Will not be here again today. If not better Monday may call and cancel out of cardiac rehab program.

## 2019-10-28 ENCOUNTER — APPOINTMENT (OUTPATIENT)
Dept: CARDIAC REHAB | Facility: HOSPITAL | Age: 64
End: 2019-10-28

## 2019-10-30 ENCOUNTER — APPOINTMENT (OUTPATIENT)
Dept: CARDIAC REHAB | Facility: HOSPITAL | Age: 64
End: 2019-10-30

## 2019-11-01 ENCOUNTER — APPOINTMENT (OUTPATIENT)
Dept: CARDIAC REHAB | Facility: HOSPITAL | Age: 64
End: 2019-11-01

## 2019-11-04 ENCOUNTER — APPOINTMENT (OUTPATIENT)
Dept: CARDIAC REHAB | Facility: HOSPITAL | Age: 64
End: 2019-11-04

## 2019-11-06 ENCOUNTER — APPOINTMENT (OUTPATIENT)
Dept: CARDIAC REHAB | Facility: HOSPITAL | Age: 64
End: 2019-11-06

## 2019-11-08 ENCOUNTER — APPOINTMENT (OUTPATIENT)
Dept: CARDIAC REHAB | Facility: HOSPITAL | Age: 64
End: 2019-11-08

## 2019-11-11 ENCOUNTER — APPOINTMENT (OUTPATIENT)
Dept: CARDIAC REHAB | Facility: HOSPITAL | Age: 64
End: 2019-11-11

## 2019-11-13 ENCOUNTER — APPOINTMENT (OUTPATIENT)
Dept: CARDIAC REHAB | Facility: HOSPITAL | Age: 64
End: 2019-11-13

## 2019-11-15 ENCOUNTER — APPOINTMENT (OUTPATIENT)
Dept: CARDIAC REHAB | Facility: HOSPITAL | Age: 64
End: 2019-11-15

## 2019-11-18 ENCOUNTER — APPOINTMENT (OUTPATIENT)
Dept: CARDIAC REHAB | Facility: HOSPITAL | Age: 64
End: 2019-11-18

## 2019-11-20 ENCOUNTER — APPOINTMENT (OUTPATIENT)
Dept: CARDIAC REHAB | Facility: HOSPITAL | Age: 64
End: 2019-11-20

## 2019-11-22 ENCOUNTER — APPOINTMENT (OUTPATIENT)
Dept: CARDIAC REHAB | Facility: HOSPITAL | Age: 64
End: 2019-11-22

## 2019-11-25 ENCOUNTER — APPOINTMENT (OUTPATIENT)
Dept: CARDIAC REHAB | Facility: HOSPITAL | Age: 64
End: 2019-11-25

## 2019-11-27 ENCOUNTER — APPOINTMENT (OUTPATIENT)
Dept: CARDIAC REHAB | Facility: HOSPITAL | Age: 64
End: 2019-11-27

## 2019-11-29 ENCOUNTER — APPOINTMENT (OUTPATIENT)
Dept: CARDIAC REHAB | Facility: HOSPITAL | Age: 64
End: 2019-11-29

## 2019-12-02 ENCOUNTER — APPOINTMENT (OUTPATIENT)
Dept: CARDIAC REHAB | Facility: HOSPITAL | Age: 64
End: 2019-12-02

## 2019-12-04 ENCOUNTER — APPOINTMENT (OUTPATIENT)
Dept: CARDIAC REHAB | Facility: HOSPITAL | Age: 64
End: 2019-12-04

## 2019-12-06 ENCOUNTER — APPOINTMENT (OUTPATIENT)
Dept: CARDIAC REHAB | Facility: HOSPITAL | Age: 64
End: 2019-12-06

## 2019-12-09 ENCOUNTER — APPOINTMENT (OUTPATIENT)
Dept: CARDIAC REHAB | Facility: HOSPITAL | Age: 64
End: 2019-12-09

## 2019-12-11 ENCOUNTER — APPOINTMENT (OUTPATIENT)
Dept: CARDIAC REHAB | Facility: HOSPITAL | Age: 64
End: 2019-12-11

## (undated) DEVICE — PINNACLE INTRODUCER SHEATH: Brand: PINNACLE

## (undated) DEVICE — BOWL PLSTC MD 16OZ BLU STRL

## (undated) DEVICE — ELECTRD DEFIB M/FUNC PROPADZ RADIOL 2PK

## (undated) DEVICE — GW DIAG EMERALD HEPCOAT MOVE JTIP STD .035 3MM 150CM

## (undated) DEVICE — CATH DIAG IMPULSE FR4 6F 100CM

## (undated) DEVICE — DEV INFL COMPAK W/ACCESSPLUS IN4530

## (undated) DEVICE — TBG NAMIC PRESS MONTR A/ F/M 12IN

## (undated) DEVICE — GUIDE CATHETER: Brand: MACH1™

## (undated) DEVICE — CATH DIAG IMPULSE FL4 6F 100CM

## (undated) DEVICE — PK TRY HEART CATH 50

## (undated) DEVICE — HI-TORQUE WHISPER MS GUIDE WIRE .014 STRAIGHT TIP 3.0 CM X 190 CM: Brand: HI-TORQUE WHISPER